# Patient Record
Sex: MALE | Race: BLACK OR AFRICAN AMERICAN | NOT HISPANIC OR LATINO | URBAN - METROPOLITAN AREA
[De-identification: names, ages, dates, MRNs, and addresses within clinical notes are randomized per-mention and may not be internally consistent; named-entity substitution may affect disease eponyms.]

---
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
## 2024-03-12 ENCOUNTER — ATHLETIC TRAINING SESSION (OUTPATIENT)
Dept: SPORTS MEDICINE | Facility: CLINIC | Age: 19
End: 2024-03-12
Payer: COMMERCIAL

## 2024-03-12 DIAGNOSIS — M25.551 PAIN OF RIGHT HIP: Primary | ICD-10-CM

## 2024-03-12 NOTE — PROGRESS NOTES
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 He states his hip pain began at the end of January and thought it was muscle soreness. The pain did not go away and began to slightly worsen. He has modified lifting on his own and has been completing less reps at practice. P! Is described as a deep ache and is rated 6/10 constantly and 8/10 at its worst. Pt admits the pain wakes him up out of his sleep sometimes and has a difficult time falling asleep due to p!. Pt experiences p! With walking and going up or down stairs. Pt experiences p! With lower body exercises and lower core. Pt throws hammer, shot put, and discus and experiences the most pain with hammer throw. Hx of surgery on MICHEL zamudio in 2015.     Handedness: right-handed  Sport played: track & field      Level: college            Pain  This is a new problem. The current episode started more than 1 month ago. The problem occurs daily. The problem has been unchanged.       ROS              Objective:       General: Americo is well-developed, well-nourished, appears stated age, in no acute distress, alert and oriented to time, place and person.     General    Constitutional: He appears well-developed and well-nourished.   Psychiatric: He has a normal mood and affect.     General Musculoskeletal Exam   Gait: normal     Right Ankle/Foot Exam     Tests   Heel Walk: able to perform  Tiptoe Walk: able to perform  Single Heel Rise: able to perform  Double Heel Rise: able to perform        Right Hip Exam     Inspection   Scars: absent  Swelling: absent  Bruising: absent  No deformity of hip.  Quadriceps Atrophy:  Negative    Range of Motion   Abduction:  normal   Adduction:  normal   Extension:  abnormal Right hip extension: with pain.  Flexion:  abnormal   External rotation:  abnormal Right hip external rotation: with pain.  Internal  rotation:  abnormal Right hip internal rotation: with pain.    Tests   Pain w/ forced internal rotation (YAIR): absent  Yvonne: negative  Trendelenburg Test: negative    Other   Sensation: normal  Left Hip Exam   Left hip exam is normal.      Back (L-Spine & T-Spine) / Neck (C-Spine) Exam     Back (L-Spine & T-Spine) Tests   Right Side Tests  Squat Test: unable to perform      Muscle Strength   Right Lower Extremity   Hip Adduction: 3/5     Pt is not TTP and states the pain is too deep to reach with palpation. Hip alignment: R side pelvis rotated posteriorly.         Assessment:     Status: L - Rehabilitation    Date Seen:  3/11/24    Date of Injury:  1/2024    Date Out:  3/11/24    Date Cleared:  n/a      Plan:       1. Pt has been scheduled with team physician on campus on 3/13/24. He has been instructed to not practice and lift only upper body and upper core. Pt has been given 220mg mediproxen. Pt hsa agreed to the plan and has been instructed to contact  if injury status changes.  2. Physician Referral: yes  3. ED Referral:no  4. Parent/Guardian Notified: No  5. All questions were answered, ath. will contact me for questions or concerns in  the interim.  6.         Eligible to use School Insurance: Yes

## 2024-03-13 ENCOUNTER — TELEPHONE (OUTPATIENT)
Dept: SPORTS MEDICINE | Facility: CLINIC | Age: 19
End: 2024-03-13
Payer: COMMERCIAL

## 2024-03-13 DIAGNOSIS — M25.551 RIGHT HIP PAIN: Primary | ICD-10-CM

## 2024-03-13 DIAGNOSIS — M99.03 SOMATIC DYSFUNCTION OF LUMBAR REGION: ICD-10-CM

## 2024-03-13 DIAGNOSIS — M99.05 SOMATIC DYSFUNCTION OF PELVIS REGION: ICD-10-CM

## 2024-03-13 DIAGNOSIS — M99.04 SOMATIC DYSFUNCTION OF SACRAL REGION: ICD-10-CM

## 2024-03-13 RX ORDER — MELOXICAM 15 MG/1
15 TABLET ORAL DAILY
Qty: 14 TABLET | Refills: 0 | Status: SHIPPED | OUTPATIENT
Start: 2024-03-13 | End: 2024-05-06

## 2024-03-13 NOTE — TELEPHONE ENCOUNTER
Athletic Training Room Note 03/13/2024  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 He gestures to the anterior aspect of the right hip when asked where he hurts and also endorses right low back pain. He denies missing time as a result of this injury.     Physical Exam:  HIP: right   The affected hip is compared to the contralateral hip.    Observation:    There is no edema, erythema, or ecchymosis in the lumbosacral region.   There is no Trendelenburg sign on either side  No obvious pelvic obliquity while standing.    No thoracolumbar scoliosis observed.    No midline skin abnormalities.  No atrophy noted in the lower limbs.    ROM:   Passive hip flexion to 120° bilaterally.    Passive hip internal rotation to 45° bilaterally.    Passive hip external rotation to 45° bilaterally.    Passive hip abduction to 45° bilaterally.    Pain present with internal and external rotation and flexion of the right hip    Tenderness To Palpation:  No tenderness at the ASIS, AIIS, PSIS, PIIS, iliac crest, pubic bones, ischial tuberosity.  No tenderness throughout the lumbar spine, iliolumbar region, or posterior pelvis.  No tenderness throughout the sacrum or piriformis  No tenderness over the greater trochanteric bursa or greater/lesser trochanters.  No tenderness at the glut attachments on the greater trochanter  No tenderness over proximal IT band or hip flexor musculature.    Strength Testing:  Hip flexion - 5/5  Hip extension - 5/5  Hip adduction - 5/5  Hip abduction - 5/5  Knee flexion - 5/5  Knee extension - 5/5    Special Tests:  Seated straight leg raise - negative  Supine straight leg raise - negative  Hamstring flexibility symmetric    Log roll - positive right  YAIR - negative  FADIR - positive right  Scour test - positive right  Positive pain  with posterior hip capsule compression on right    ASIS compression test - negative  SI drawer test - negative   Thigh thrust test - negative     Kyree test reveals tight hip flexor on right  Quadriceps flexibility tighter on right.  Yvonne test - negative  Jhonatan compression test - negative    Fulcrum test - negative      Neurovascular Exam:  Normal gait without Trendelenburg.  2+ femoral, DP, and PT pulses BL.  No skin changes, no abnormal hair distribution.  Sensation intact to light touch throughout the obturator and medial/lateral/posterior femoral cutaneous nerves.  Capillary refill intact to <2 seconds in all lower limb digits.       Cervical Spine  Thoracic Spine  Lumbar Spine   C1 Neutral T1 Neutral L1 Neutral   C2 Neutral T2 Neutral L2 Neutral   C3 Neutral T3 Neutral L3 Neutral   C4 Neutral T4 Neutral L4 ERSR   C5 Neutral T5 Neutral L5 ERSR   C6 Neutral T6 Neutral     C7 Neutral T7 Neutral       T8 Neutral       T9 Neutral       T10 Neutral       T11 Neutral       T12 Neutral       Pelvis:  Innominate:Right superior shear  Pubic bone:Right superior pubic shear    Sacrum:Left on Right sacral torsion    Lower Extremity:      Key   F= Flexed   E = Extended   R = Rotated   N = Neutral   S = Sidebent   TTA = tissue texture abnormality   L/R/B (last letter) = left/right/bilateral   HTP = fascial herniated trigger point   TB = fascial trigger band   CD = fascial continuum distortion       Assessment:  1. Right hip pain    2. Somatic dysfunction of lumbar region    3. Somatic dysfunction of sacral region    4. Somatic dysfunction of pelvis region          Plan:     Symptoms and exam findings are most consistent with myofascial restriction associated with poor compensation/neuromuscular firing.    Based on his description/body language of pain and somatic dysfunction identified on exam, I discussed osteopathic manipulation as a treatment option today.  He consents to evaluation and treatment.  See below.    OMT 3-4  regions. Oral consent obtained. Reviewed benefits and potential side effects. OMT indicated today due to signs and symptoms as well as local and remote somatic dysfunction findings and their related neurokinetic, lymphatic, fascial and/or arteriovenous body connections. OMT techniques used: Soft Tissue, Myofascial Release, Muscle Energy, and High Velocity Low Amplitude. Treatment was tolerated well. Improvement noted in segmental mobility post-treatment in dysfunctional regions. There were no adverse events and no complications immediately following treatment. Advised plenty of water to help alleviate soreness.    Medications - Mobic 15 mg to be taken daily for 2 weeks     Exercises - core, pelvis strengthening and stabilizing    Referrals - none at this time    Imaging - will obtain imaging if symptoms worsen or do not respond to OMT, hip x-ray, possibly MRI to assess for intra-articular pathology    ATR - Sport Participation: Full sport participation as tolerated    Follow up - as needed MIESHA WEBSTER            This note was completed in the presence of the physician noted above    This note is dictated using the M*Modal Fluency Direct word recognition program. There are word recognition mistakes that are occasionally missed on review.

## 2024-03-25 ENCOUNTER — HOSPITAL ENCOUNTER (OUTPATIENT)
Dept: RADIOLOGY | Facility: HOSPITAL | Age: 19
Discharge: HOME | End: 2024-03-25
Attending: ORTHOPAEDIC SURGERY
Payer: COMMERCIAL

## 2024-03-25 ENCOUNTER — OFFICE VISIT (OUTPATIENT)
Dept: SPORTS MEDICINE | Facility: CLINIC | Age: 19
End: 2024-03-25
Payer: COMMERCIAL

## 2024-03-25 VITALS — DIASTOLIC BLOOD PRESSURE: 64 MMHG | HEART RATE: 50 BPM | SYSTOLIC BLOOD PRESSURE: 131 MMHG | WEIGHT: 244.69 LBS

## 2024-03-25 DIAGNOSIS — M25.551 PAIN OF RIGHT HIP: ICD-10-CM

## 2024-03-25 DIAGNOSIS — M25.551 RIGHT HIP PAIN: ICD-10-CM

## 2024-03-25 DIAGNOSIS — M54.50 LUMBAR PAIN: Primary | ICD-10-CM

## 2024-03-25 DIAGNOSIS — M54.50 LUMBAR PAIN: ICD-10-CM

## 2024-03-25 PROCEDURE — 3078F DIAST BP <80 MM HG: CPT | Mod: CPTII,S$GLB,, | Performed by: ORTHOPAEDIC SURGERY

## 2024-03-25 PROCEDURE — 72114 X-RAY EXAM L-S SPINE BENDING: CPT | Mod: TC

## 2024-03-25 PROCEDURE — 73502 X-RAY EXAM HIP UNI 2-3 VIEWS: CPT | Mod: 26,RT,, | Performed by: INTERNAL MEDICINE

## 2024-03-25 PROCEDURE — 99204 OFFICE O/P NEW MOD 45 MIN: CPT | Mod: S$GLB,,, | Performed by: ORTHOPAEDIC SURGERY

## 2024-03-25 PROCEDURE — 3075F SYST BP GE 130 - 139MM HG: CPT | Mod: CPTII,S$GLB,, | Performed by: ORTHOPAEDIC SURGERY

## 2024-03-25 PROCEDURE — 73502 X-RAY EXAM HIP UNI 2-3 VIEWS: CPT | Mod: TC,RT

## 2024-03-25 PROCEDURE — 1160F RVW MEDS BY RX/DR IN RCRD: CPT | Mod: CPTII,S$GLB,, | Performed by: ORTHOPAEDIC SURGERY

## 2024-03-25 PROCEDURE — 72114 X-RAY EXAM L-S SPINE BENDING: CPT | Mod: 26,,, | Performed by: INTERNAL MEDICINE

## 2024-03-25 PROCEDURE — 99999 PR PBB SHADOW E&M-EST. PATIENT-LVL III: CPT | Mod: PBBFAC,,, | Performed by: ORTHOPAEDIC SURGERY

## 2024-03-25 PROCEDURE — 1159F MED LIST DOCD IN RCRD: CPT | Mod: CPTII,S$GLB,, | Performed by: ORTHOPAEDIC SURGERY

## 2024-03-25 NOTE — PROGRESS NOTES
CC: right hip pain    19 y.o. Male presents today for evaluation of his right hip pain. 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 He also endorses tightness at the right low back and glute musculature.   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 No pertinent past medical history.    PAST SURGICAL HISTORY:   History reviewed. No pertinent surgical history.    FAMILY HISTORY:   History reviewed. No pertinent family history.    SOCIAL HISTORY:   Social History     Socioeconomic History    Marital status: Single     Social Determinants of Health     Financial Resource Strain: Low Risk  (3/24/2024)    Overall Financial Resource Strain (CARDIA)     Difficulty of Paying Living Expenses: Not hard at all   Food Insecurity: No Food Insecurity (3/24/2024)    Hunger Vital Sign     Worried About Running Out of Food in the Last Year: Never true     Ran Out of Food in the Last Year: Never true   Transportation Needs: No Transportation Needs (3/24/2024)    PRAPARE - Transportation     Lack of Transportation (Medical): No     Lack of Transportation (Non-Medical): No   Physical Activity: Sufficiently Active (3/24/2024)    Exercise Vital Sign     Days of Exercise per Week: 6 days     Minutes of Exercise per Session: 60 min   Stress: No Stress Concern Present (3/24/2024)    [employer] [place] [employer] - Occupational Stress Questionnaire     Feeling of  Stress : Only a little   Social Connections: Unknown (3/24/2024)    Social Connection and Isolation Panel [NHANES]     Frequency of Communication with Friends and Family: More than three times a week     Frequency of Social Gatherings with Friends and Family: More than three times a week     Active Member of Clubs or Organizations: Yes     Attends Club or Organization Meetings: More than 4 times per year     Marital Status: Never    Housing Stability: Low Risk  (3/24/2024)    Housing Stability Vital Sign     Unable to Pay for Housing in the Last Year: No     Number of Places Lived in the Last Year: 2     Unstable Housing in the Last Year: No       MEDICATIONS:     Current Outpatient Medications:     meloxicam (MOBIC) 15 MG tablet, Take 1 tablet (15 mg total) by mouth once daily., Disp: 14 tablet, Rfl: 0    ALLERGIES:   Review of patient's allergies indicates:  No Known Allergies     PHYSICAL EXAMINATION:  /64   Pulse (!) 50   Wt 111 kg (244 lb 11.4 oz)   Vitals signs and nursing note have been reviewed.  General: In no acute distress, well developed, well nourished, no diaphoresis  Eyes: EOM full and smooth, no eye redness or discharge  HENT: normocephalic and atraumatic, neck supple, trachea midline, no nasal discharge, no external ear redness or discharge  Cardiovascular: no LE edema  Lungs: respirations non-labored, no conversational dyspnea   Abd: non-distended, no rigidity  MSK: no amputation or deformity, no swelling of extremities  Neuro: AAOx3, CN2-12 grossly intact  Skin: No rashes, warm and dry  Psychiatric: cooperative, pleasant, mood and affect appropriate for age    HIP: RIGHT  The affected hip is compared to the contralateral hip.    Observation:    There is no edema, erythema, or ecchymosis in the lumbosacral region.   No obvious pelvic obliquity while standing.    No thoracolumbar scoliosis observed.    No midline skin abnormalities.  No atrophy noted in the lower limbs.    ROM:    Passive hip flexion to 120° on left and 120° on right.    Passive hip internal rotation to 45° on left and 45° on right.    Passive hip external rotation to 45° on left and 45° on right.    Passive hip abduction to 45° on left and 45° on right.    All motions above are without pain.    Tenderness To Palpation:  + tenderness at the ASIS, AIIS, PSIS, PIIS  No tenderness over the iliac crest, pubic bones, ischial tuberosity.  No tenderness throughout the lumbar spine, iliolumbar region, or posterior pelvis.  No tenderness throughout the sacrum or piriformis  No tenderness over the greater trochanteric bursa or greater/lesser trochanters.  No tenderness at the glut attachments on the greater trochanter  + tenderness over proximal IT band and right hip flexor musculature    Strength Testing: (*pain)  Hip flexion - 5/5 on left and 5/5 on right  Hip extension - 5/5 on left and 5/5 on right  Hip adduction - 5/5 on left and 5/5 on right  Hip abduction - 5/5 on left and 5/5 on right  Knee flexion - 5/5 on left and 5/5 on right  Knee extension - 5/5 on left and 5/5 on right    Special Tests:  Seated straight leg raise - negative  Supine straight leg raise - negative  Hamstring flexibility symmetric  Quadriceps flexibility symmetric    Log roll - negative  YAIR - positive  FADIR - positive  Scour test - positive  + pain with posterior hip capsule compression    ASIS compression test - negative  SI drawer test - negative   Thigh thrust test - negative     Kyree test reveals tight iliopsoas, rectus femoris, and IT band.    Yvonne test - negative  Jhonatan compression test - negative    Fulcrum test - negative    Neurovascular Exam:  Normal gait without Trendelenburg.  2+ femoral, DP, and PT pulses BL.  No skin changes, no abnormal hair distribution.  Sensation intact to light touch throughout the obturator and medial/lateral/posterior femoral cutaneous nerves.  Capillary refill intact to <2 seconds in all lower limb  digits.      IMAGIN. X-ray ordered due to right hip pain   2. X-ray images were reviewed personally by me and then directly with patient.  3. FINDINGS: X-ray images obtained demonstrate no fracture or dislocation, hip joint spaces maintained  4. IMPRESSION: As above.     1. X-ray ordered due to right low back pain   2. X-ray images were reviewed personally by me and then directly with patient.  3. FINDINGS: X-ray images obtained demonstrate vertebral bodies are normally aligned and normal in height, disc spaces maintained, no significant osteophyte formation along vertebral endplates  4. IMPRESSION: As above.       ASSESSMENT:      ICD-10-CM ICD-9-CM   1. Lumbar pain  M54.50 724.2   2. Pain of right hip  M25.551 719.45         PLAN:  1-2. 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 He endorses mechanical symptoms and feelings of instability.    - XRs ordered in the office today and images were personally reviewed with the patient. See above for further detail.    - Symptoms, exam, and imaging are most consistent with hip joint pathology, most concerning for labral tear.  To better assess the structures of the hip joint, an MRI arthrogram has been ordered and scheduled.     - OK to continue with core strengthening/stabilization with his . Will hold from TF competition due to persistent pain that worsens with athletic activity.    - His  was present for the duration of the visit today and expressed understanding to the plan.       Future planning includes - next steps pending MRI results, possible IA CSI, PT    All questions were answered to the best of my ability and all concerns were addressed at this time.    Follow up after MRI for results and next steps.       This note is dictated using the M*Modal Fluency Direct word recognition  program. There are word recognition mistakes that are occasionally missed on review.

## 2024-03-26 ENCOUNTER — ATHLETIC TRAINING SESSION (OUTPATIENT)
Dept: SPORTS MEDICINE | Facility: CLINIC | Age: 19
End: 2024-03-26
Payer: COMMERCIAL

## 2024-03-26 DIAGNOSIS — M25.551 PAIN OF RIGHT HIP: Primary | ICD-10-CM

## 2024-03-26 NOTE — PROGRESS NOTES
3/18/24  Americo competed in his track meet on 3/16/24 despite experiencing p!. He has been instructed to stop practicing, complete his exercises daily, and collaborate with his throwing  for alternate training/lifting plan for the week. An appointment has been made with team physician for 3/26/24.     3/13/24  Americo was instructed to try and practice on Thursday if he feels comfortable and experiences no pain with throwing. If he experiences p! He was instructed to not compete.

## 2024-03-26 NOTE — PROGRESS NOTES
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    Handedness: right-handed  Sport played: track & field      Level: college            Pain  This is a new problem. The current episode started 1 to 4 weeks ago. The problem occurs constantly. The problem has been unchanged. The treatment provided no relief.       ROS              Objective:       General: Americo is well-developed, well-nourished, appears stated age, in no acute distress, alert and oriented to time, place and person.     AT Session  3/13/24  Americo has been given the following exercises to complete daily on his own.     Exercise Reps/Sets/Time   Pelvic clocks 2x10   Ant marches 2x10   Diagrammatic breaths x10   Kneeling hip flexor stretch  [id number]   Kneeling hip ABD stretch  [id number]            Assessment:     Status: L - Limited    Date Seen:  3/13/24    Date of Injury:  January 2024    Date Out:  3/18/24    Date Cleared:  n/a

## 2024-04-17 ENCOUNTER — HOSPITAL ENCOUNTER (OUTPATIENT)
Dept: RADIOLOGY | Facility: HOSPITAL | Age: 19
Discharge: HOME | End: 2024-04-17
Attending: ORTHOPAEDIC SURGERY
Payer: COMMERCIAL

## 2024-04-17 DIAGNOSIS — M25.551 PAIN OF RIGHT HIP: ICD-10-CM

## 2024-04-17 PROCEDURE — A9585 GADOBUTROL INJECTION: HCPCS | Performed by: ORTHOPAEDIC SURGERY

## 2024-04-17 PROCEDURE — 25000003 PHARM REV CODE 250: Performed by: ORTHOPAEDIC SURGERY

## 2024-04-17 PROCEDURE — 27093 INJECTION FOR HIP X-RAY: CPT | Mod: RT,,, | Performed by: RADIOLOGY

## 2024-04-17 PROCEDURE — 73722 MRI JOINT OF LWR EXTR W/DYE: CPT | Mod: 26,RT,, | Performed by: RADIOLOGY

## 2024-04-17 PROCEDURE — 63600175 PHARM REV CODE 636 W HCPCS: Mod: JZ,JG | Performed by: ORTHOPAEDIC SURGERY

## 2024-04-17 PROCEDURE — 73722 MRI JOINT OF LWR EXTR W/DYE: CPT | Mod: TC,RT

## 2024-04-17 PROCEDURE — 77002 NEEDLE LOCALIZATION BY XRAY: CPT | Mod: 26,RT,, | Performed by: RADIOLOGY

## 2024-04-17 PROCEDURE — 25500020 PHARM REV CODE 255: Performed by: ORTHOPAEDIC SURGERY

## 2024-04-17 RX ORDER — BUPIVACAINE HYDROCHLORIDE 2.5 MG/ML
5 INJECTION, SOLUTION EPIDURAL; INFILTRATION; INTRACAUDAL ONCE
Status: COMPLETED | OUTPATIENT
Start: 2024-04-17 | End: 2024-04-17

## 2024-04-17 RX ORDER — GADOBUTROL 604.72 MG/ML
5 INJECTION INTRAVENOUS
Status: COMPLETED | OUTPATIENT
Start: 2024-04-17 | End: 2024-04-17

## 2024-04-17 RX ORDER — LIDOCAINE HYDROCHLORIDE 10 MG/ML
1 INJECTION INFILTRATION; PERINEURAL ONCE
Status: COMPLETED | OUTPATIENT
Start: 2024-04-17 | End: 2024-04-17

## 2024-04-17 RX ADMIN — LIDOCAINE HYDROCHLORIDE 1 ML: 10 INJECTION, SOLUTION INFILTRATION; PERINEURAL at 01:04

## 2024-04-17 RX ADMIN — BUPIVACAINE HYDROCHLORIDE 12.5 MG: 2.5 INJECTION, SOLUTION EPIDURAL; INFILTRATION; INTRACAUDAL; PERINEURAL at 01:04

## 2024-04-17 RX ADMIN — GADOBUTROL 5 ML: 604.72 INJECTION INTRAVENOUS at 01:04

## 2024-04-17 RX ADMIN — IOHEXOL 5 ML: 300 INJECTION, SOLUTION INTRAVENOUS at 01:04

## 2024-04-19 ENCOUNTER — TELEPHONE (OUTPATIENT)
Dept: URGENT CARE | Facility: CLINIC | Age: 19
End: 2024-04-19
Payer: COMMERCIAL

## 2024-05-06 ENCOUNTER — OFFICE VISIT (OUTPATIENT)
Dept: SPORTS MEDICINE | Facility: CLINIC | Age: 19
End: 2024-05-06
Payer: COMMERCIAL

## 2024-05-06 VITALS
SYSTOLIC BLOOD PRESSURE: 123 MMHG | BODY MASS INDEX: 34.75 KG/M2 | HEART RATE: 69 BPM | HEIGHT: 70 IN | WEIGHT: 242.75 LBS | DIASTOLIC BLOOD PRESSURE: 76 MMHG

## 2024-05-06 DIAGNOSIS — M99.03 SOMATIC DYSFUNCTION OF LUMBAR REGION: ICD-10-CM

## 2024-05-06 DIAGNOSIS — M25.551 PAIN OF RIGHT HIP: Primary | ICD-10-CM

## 2024-05-06 DIAGNOSIS — M99.04 SOMATIC DYSFUNCTION OF SACRAL REGION: ICD-10-CM

## 2024-05-06 DIAGNOSIS — M99.05 SOMATIC DYSFUNCTION OF PELVIS REGION: ICD-10-CM

## 2024-05-06 PROCEDURE — 1160F RVW MEDS BY RX/DR IN RCRD: CPT | Mod: CPTII,S$GLB,, | Performed by: ORTHOPAEDIC SURGERY

## 2024-05-06 PROCEDURE — 3078F DIAST BP <80 MM HG: CPT | Mod: CPTII,S$GLB,, | Performed by: ORTHOPAEDIC SURGERY

## 2024-05-06 PROCEDURE — 98926 OSTEOPATH MANJ 3-4 REGIONS: CPT | Mod: S$GLB,,, | Performed by: ORTHOPAEDIC SURGERY

## 2024-05-06 PROCEDURE — 1159F MED LIST DOCD IN RCRD: CPT | Mod: CPTII,S$GLB,, | Performed by: ORTHOPAEDIC SURGERY

## 2024-05-06 PROCEDURE — 3074F SYST BP LT 130 MM HG: CPT | Mod: CPTII,S$GLB,, | Performed by: ORTHOPAEDIC SURGERY

## 2024-05-06 PROCEDURE — 3008F BODY MASS INDEX DOCD: CPT | Mod: CPTII,S$GLB,, | Performed by: ORTHOPAEDIC SURGERY

## 2024-05-06 PROCEDURE — 99999 PR PBB SHADOW E&M-EST. PATIENT-LVL III: CPT | Mod: PBBFAC,,, | Performed by: ORTHOPAEDIC SURGERY

## 2024-05-06 PROCEDURE — 99214 OFFICE O/P EST MOD 30 MIN: CPT | Mod: 25,S$GLB,, | Performed by: ORTHOPAEDIC SURGERY

## 2024-05-06 NOTE — PROGRESS NOTES
CC: right hip pain    19 y.o. Male presents today for evaluation of his right hip pain. 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  He has not found any significant improvement since he was initially held from track and field activity.  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    PAST SURGICAL HISTORY:   No past surgical history on file.    FAMILY HISTORY:   No family history on file.    SOCIAL HISTORY:   Social History     Socioeconomic History    Marital status: Single     Social Determinants of Health     Financial Resource Strain: Low Risk  (3/24/2024)    Overall Financial Resource Strain (CARDIA)     Difficulty of Paying Living Expenses: Not hard at all   Food Insecurity: No Food Insecurity (3/24/2024)    Hunger Vital Sign     Worried About Running Out of Food in the Last Year: Never true     Ran Out of Food in the Last Year: Never true   Transportation Needs: No Transportation Needs (3/24/2024)    PRAPARE - Transportation     Lack of Transportation (Medical): No     Lack of Transportation (Non-Medical): No   Physical Activity: Sufficiently Active (3/24/2024)    Exercise Vital Sign     Days of Exercise per Week: 6 days     Minutes of Exercise per Session: 60 min   Stress: No Stress Concern Present (3/24/2024)    [employer] [place] [employer] - Occupational Stress Questionnaire     Feeling of Stress : Only a little   Housing  "Stability: Low Risk  (3/24/2024)    Housing Stability Vital Sign     Unable to Pay for Housing in the Last Year: No     Number of Places Lived in the Last Year: 2     Unstable Housing in the Last Year: No       MEDICATIONS:   No current outpatient medications on file.    ALLERGIES:   Review of patient's allergies indicates:  No Known Allergies     PHYSICAL EXAMINATION:  /76   Pulse 69   Ht 5' 10" (1.778 m)   Wt 110.1 kg (242 lb 11.6 oz)   BMI 34.83 kg/m²   Vitals signs and nursing note have been reviewed.  General: In no acute distress, well developed, well nourished, no diaphoresis  Eyes: EOM full and smooth, no eye redness or discharge  HENT: normocephalic and atraumatic, neck supple, trachea midline, no nasal discharge, no external ear redness or discharge  Cardiovascular: no LE edema  Lungs: respirations non-labored, no conversational dyspnea   Abd: non-distended, no rigidity  MSK: no amputation or deformity, no swelling of extremities  Neuro: AAOx3, CN2-12 grossly intact  Skin: No rashes, warm and dry  Psychiatric: cooperative, pleasant, mood and affect appropriate for age    HIP: RIGHT  The affected hip is compared to the contralateral hip.    Observation:    There is no edema, erythema, or ecchymosis in the lumbosacral region.   No obvious pelvic obliquity while standing.    No thoracolumbar scoliosis observed.    No midline skin abnormalities.  No atrophy noted in the lower limbs.    ROM:   Passive hip flexion to 120° on left and 120° on right.    Passive hip internal rotation to 45° on left and 45° on right.    Passive hip external rotation to 45° on left and 45° on right.    Passive hip abduction to 45° on left and 45° on right.    All motions above are without pain.    Tenderness To Palpation:  + tenderness at the ASIS, AIIS  No tenderness at the PSIS, PIIS, iliac crest, pubic bones, ischial tuberosity.  No tenderness throughout the lumbar spine, iliolumbar region, or posterior pelvis.  No " tenderness throughout the sacrum or piriformis  No tenderness over the greater trochanteric bursa or greater/lesser trochanters.  + tenderness at the glut attachments on the greater trochanter  No tenderness over proximal IT band or hip flexor musculature.    Strength Testing: (*pain)  Hip flexion - 5/5 on left and 5/5 on right  Hip extension - 5/5 on left and 5/5 on right  Hip adduction - 5/5 on left and 5/5 on right  Hip abduction - 5/5 on left and 5/5 on right  Knee flexion - 5/5 on left and 5/5 on right  Knee extension - 5/5 on left and 5/5 on right    Special Tests:  Seated straight leg raise - negative  Supine straight leg raise - negative  Hamstring flexibility tighter on right  Quadriceps flexibility tighter on right    Log roll - positive  YAIR - positive  FADIR - positive  Scour test - positive  No pain with posterior hip capsule compression    ASIS compression test - negative  SI drawer test - negative   Thigh thrust test - negative     Kyree test reveals tight iliopsoas, rectus femoris, and IT band.    Yvonne test - negative  Jhonatan compression test - negative    Fulcrum test - negative    Posture:  Upright  Gait: Non-antalgic     TART (Tissue texture abnormality, Asymmetry,  Restriction of motion and/or Tenderness) changes:    Head:      Cervical Spine  Thoracic Spine  Lumbar Spine   C1 Neutral T1 Neutral L1 Neutral   C2 Neutral T2 Neutral L2 Neutral   C3 Neutral T3 Neutral L3 Neutral   C4 Neutral T4 Neutral L4 FRSL   C5 Neutral T5 Neutral L5 FRSL   C6 Neutral T6 Neutral     C7 Neutral T7 Neutral       T8 Neutral       T9 Neutral       T10 Neutral       T11 Neutral       T12 Neutral       Ribs:    Upper Extremity:    Abdomen:    Pelvis:  Innominate:Right anterior rotation  Pubic bone:Right inferior pubic shear    Sacrum:Left on Left sacral torsion    Lower Extremity:      Key   F= Flexed   E = Extended   R = Rotated   N = Neutral   S = Sidebent   TTA = tissue texture abnormality   L/R/B (last letter) =  left/right/bilateral   HTP = fascial herniated trigger point   TB = fascial trigger band   CD = fascial continuum distortion       Neurovascular Exam:  Normal gait without Trendelenburg.  2+ femoral, DP, and PT pulses BL.  No skin changes, no abnormal hair distribution.  Sensation intact to light touch throughout the obturator and medial/lateral/posterior femoral cutaneous nerves.  Capillary refill intact to <2 seconds in all lower limb digits.      IMAGIN. X-ray obtained 2024 due to right hip pain   2. X-ray images were reviewed personally by me and then directly with patient.  3. FINDINGS: X-ray images obtained demonstrate no fracture or dislocation, joint spaces maintained  4. IMPRESSION: As above.     1. MRI arthrogram obtained 2024 due to right hip pain   2. MRI images were reviewed personally by me and then directly with patient.  3. FINDINGS: MRI images obtained demonstrate slight signal heterogeneity of the anterior-superior acetabular labrum without signal intensity  4. IMPRESSION: As above.       ASSESSMENT:      ICD-10-CM ICD-9-CM   1. Pain of right hip  M25.551 719.45   2. Somatic dysfunction of lumbar region  M99.03 739.3   3. Somatic dysfunction of sacral region  M99.04 739.4   4. Somatic dysfunction of pelvis region  M99.05 739.5         PLAN:  Right hip pain -     - Americo is here today for right hip pain. 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     - XRs obtained 2025 and MRI arthrogram obtained 2024 and images were personally reviewed with the patient. See above for further detail.    - Symptoms, exam, and imaging are most consistent with hip impingement syndrome secondary to somatic dysfunction and poor neuromuscular firing patterns.  We discussed the importance of decreasing inflammation and strengthening and stabilizing to help promote and maintain symptom improvement/resolution.  This is  commonly accomplished with a short course of an anti-inflammatory and icing in addition to osteopathic manipulation, a home exercise program or physical therapy.    - Based on his description/body language of pain and somatic dysfunction identified on exam, I discussed osteopathic manipulation as a treatment option today.  He consents to evaluation and treatment.  See below.    - Physical therapy referral has been placed to start June when he returns back to [place].    - End of season paperwork has been filled out and e-mailed back to his .     - Contact has been made with their  who is on board with the plan.       2-4.  Somatic dysfunction of pelvis, sacral, lumbar regions -     - OMT 3-4 regions. Oral consent obtained. Reviewed benefits and potential side effects. OMT indicated today due to signs and symptoms as well as local and remote somatic dysfunction findings and their related neurokinetic, lymphatic, fascial and/or arteriovenous body connections. OMT techniques used: Soft Tissue, Myofascial Release, and Muscle Energy. Treatment was tolerated well. Improvement noted in segmental mobility post-treatment in dysfunctional regions. There were no adverse events and no complications immediately following treatment. Advised plenty of water to help alleviate soreness.      Future planning includes - physical therapy modifications, possibly more OMT if helpful and if indicated    All questions were answered to the best of my ability and all concerns were addressed at this time.    Follow up as needed.       This note is dictated using the M*Modal Fluency Direct word recognition program. There are word recognition mistakes that are occasionally missed on review.

## 2024-06-05 ENCOUNTER — CLINICAL SUPPORT (OUTPATIENT)
Dept: REHABILITATION | Facility: OTHER | Age: 19
End: 2024-06-05
Attending: ORTHOPAEDIC SURGERY
Payer: COMMERCIAL

## 2024-06-05 DIAGNOSIS — M25.60 DECREASED RANGE OF MOTION: ICD-10-CM

## 2024-06-05 DIAGNOSIS — M25.551 PAIN OF RIGHT HIP: ICD-10-CM

## 2024-06-05 DIAGNOSIS — R26.9 ABNORMAL GAIT: Primary | ICD-10-CM

## 2024-06-05 DIAGNOSIS — R53.1 DECREASED STRENGTH: ICD-10-CM

## 2024-06-05 PROCEDURE — 97161 PT EVAL LOW COMPLEX 20 MIN: CPT | Mod: PN

## 2024-06-05 PROCEDURE — 97140 MANUAL THERAPY 1/> REGIONS: CPT | Mod: PN

## 2024-06-05 PROCEDURE — 97112 NEUROMUSCULAR REEDUCATION: CPT | Mod: PN

## 2024-06-06 PROBLEM — R26.9 ABNORMAL GAIT: Status: ACTIVE | Noted: 2024-06-06

## 2024-06-06 PROBLEM — M25.60 DECREASED RANGE OF MOTION: Status: ACTIVE | Noted: 2024-06-06

## 2024-06-06 PROBLEM — R53.1 DECREASED STRENGTH: Status: ACTIVE | Noted: 2024-06-06

## 2024-06-06 NOTE — PLAN OF CARE
[facility]   Physical Therapy Initial Evaluation      Name: Americo Ulloa  [facility] Number: [id number]    Therapy Diagnosis:   Encounter Diagnoses   Name Primary?    Pain of right hip     Abnormal gait Yes    Decreased range of motion     Decreased strength         Physician: Wes Sullivan DO    Physician Orders: PT Eval and Treat   Medical Diagnosis from Referral: M25.551 (ICD-10-CM) - Pain of right hip   Evaluation Date: 6/5/2024  Authorization Period Expiration: 5/6/2024  Plan of Care Expiration: 8/30/2024  Progress Note Due: 7/5/2024  Visit # / Visits authorized: 1/ 1   FOTO: 6/5/2024    Time In: 4:46pm  Time Out: 5:45pm  Total Appointment Time (timed & untimed codes): 59 minutes    Precautions: Standard     Subjective     Date of onset: Jan 2024    History of current condition - AMERICO reports: Pt reports Right hip pain that started in Jan. He thought it was going to go away but it didn't. In march it was super painful. Now its just a nagging thing he deals with every day. He was told it was probably something with over use. He feels it the most when he tries to rotate the leg. But was told the MRI didn't really show that but it could be something with his tendon. 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 This was all started during indoor track. He competed 1x in outdoor season. No longer doing outdoor. His first meet will probably be in December. He does throw with his Right arm. Does wear a special shoe for the throwing but it is worn out. Needs new shoes. Pain is anterior and less deep. Describes it as an achy pain. Mild discomfort all the time. Painful during walking when his Right leg extends backwards too much. Takes a while to go to sleep because of the achiness in the leg. That never goes away and it takes him a while to find a position he can sleep. Of note he has low back pain and thinks that the he started to get after  the hip started hurting. The pain in the back is off to the Right. Describes it as the same mild and achy pain that is constant as the hip. Taya feels like it may be tightness.     He isn't training but he is working out, mostly doing upper body, RDLs, Leg extension, unable to do squats. He did see his ATC for a little bit and he was doing hip exercises.    Right knee surgery meniscal repair [employer] year of high school - did not have PT for this after surgery.     Medical History:   No past medical history on file.    Surgical History:   Americo Ulloa  has no past surgical history on file.    Medications:   Americo currently has no medications in their medication list.    Allergies:   Review of patient's allergies indicates:  No Known Allergies     Imaging, MRI studies: Impression:     1. Slight signal heterogeneity of the anterior-superior acetabular labrum without associated contrast imbibition to suggest a tear.     Electronically signed by resident: Autumn Reyes    Prior Therapy: No previous PT. Saw ATC   Social History:  lives with a friend  Occupation: [employer]   Prior Level of Function: Independent  Current Level of Function: Difficulty with recreational activities.     Pain:  Hip Current 1/10, worst 8/10, best 1/10  Low back Current 0/10, Worst 2/10, best 0/10  Location: right ant hip  Description: Aching and Tight  Aggravating Factors: Sitting, Standing, Bending, and Walking  Easing Factors: pain medication and rest    Pts goals: Would like the pain to go away so he can return to his normal stuff.     Objective     Observation: Pt appears well nourished and in no signs of distress.   Posture: B pes planus, internally rotation shoulder, scapular abduction,, thoracic kyphosis    Gait: lack of hip extension B. Left rotation extension   Sensation: intact    Functional Tests:  Squat: Increased hip flexion pain 7/10  Single leg balance: left hip drop during Right balance,  no pain  Single leg squat: Right femoral IR/Add hip drop on Left - no pain    Range of Motion/Strength:       Hip Right Left Pain/Dysfunction with Movement   AROM      flexion  105  115 Pain on R   extension  5  10 Pain on R   Internal rotation  20  25 Pain on R   External rotation  25  35        L/E MMT Right Left Pain/Dysfunction with Movement   Hip Flexion/Hooklying 3/5 4+/5 AGMR   Hip Extension 4-/5 5/5    Hip Abduction/PGM 3/5 4/5    Supine Hip IR 3+/5 5/5 Pain on R   Supine Hip ER 3+/5 5/5 Pain on R     L/E Strength w/ MicroFET Muscle Antoine Dynamometer Right Left LSI%   Hip Flexion 26.6 kg  44 kg  60% pain   Hip Ext 31.8 kg  43.1 kg  73%   Hip IR 34.5 kg  31.1 kg  110%   Hip ER 16.5 kg  40.6 kg  41% pain       Joint Mobility:   - Hip: IR hypomobility on the Right with pain, painful endfeel into hip flexion     Special Tests:   Step down test - increase trunk lean, Right hip elevated, femoral add/IR   + Scour - pain with flexion/adduction  + YAIR  + FADDIR   - LLD - no rotation seen in pelvis and equal malleoli       CMS Impairment/Limitation/Restriction for FOTO Survey    Therapist reviewed FOTO scores for Americo Ulloa on 6/5/2024.   FOTO documents entered into [employer] - see Media section.           TREATMENT     Treatment Time In: 5:22  Treatment Time Out: 5:45  Total Treatment time separate from Evaluation: 23 minutes    AMERICO received the following manual therapy techniques: Joint mobilizations were applied to the: Right hip for 8 minutes, including:  LAD grade 4-5 mobilization  True Hip distraction grade 4-5 mobilization     AMERICO participated in neuromuscular re-education activities to improve: Coordination, Kinesthetic, Sense, Proprioception, and Posture for 15 minutes. The following activities were included:  Pretzel hip extended 2x10   Modified side plank clamshell 2x10   Seated hip IR/ER hip mobilization x10  Pt education    Home Exercises and Patient Education Provided    Education provided:    - Pt educated on POC  - Pt educated on HEP  - Pt educated on anatomy and physiology of current condition as it relates to signs and symptoms    Written Home Exercises Provided: yes.  Exercises were reviewed and AMERICO was able to demonstrate them prior to the end of the session.  AMERICO demonstrated good  understanding of the education provided.     See EMR under Patient Instructions for exercises provided 6/5/2024.    Assessment     Americo is a 19 y.o. male referred to outpatient Physical Therapy with a medical diagnosis of pain of right hip. Patient presents with signs and symptoms of hip impingement due to poor hip control and pain. He demonstrates anterior glide medial rotation during hip flexion isolation. He further demonstrates hip internal rotation joint mobility deficits which may further be contributing to dysfunction. He would benefit from continued skilled PT to address dysfunction listed above in order to return pt to previous level of function.    Patient prognosis is Good.   Patient will benefit from skilled outpatient Physical Therapy to address the deficits stated above and in the chart below, provide patient /family education, and to maximize patientt's level of independence.     Plan of care discussed with patient: Yes  Patient's spiritual, cultural and educational needs considered and patient is agreeable to the plan of care and goals as stated below:     Anticipated Barriers for therapy: None    Medical Necessity is demonstrated by the following  History  Co-morbidities and personal factors that may impact the plan of care [x] LOW: no personal factors / co-morbidities  [] MODERATE: 1-2 personal factors / co-morbidities  [] HIGH: 3+ personal factors / co-morbidities    Moderate / High Support Documentation:   Co-morbidities affecting plan of care: none    Personal Factors:   no deficits     Examination  Body Structures and Functions, activity limitations and participation restrictions that  may impact the plan of care [] LOW: addressing 1-2 elements  [x] MODERATE: 3+ elements  [] HIGH: 4+ elements (please support below)    Moderate / High Support Documentation: Difficulty walking, standing, squatting      Clinical Presentation [x] LOW: stable  [] MODERATE: Evolving  [] HIGH: Unstable     Decision Making/ Complexity Score: low           Goals:  STG (6 Weeks)  Pt will be independent with Initial home exercise program in order to facilitate Physical Therapy treatment  Pt will reduce worst pain to 4/10 in order to perform ADLs  Pt will improve hip flexion and IR by 5' in order to perform recreational tasks  Pt will improve hip External rotation and flexion strength to >80% contralateral LE in order to perform recreational tasks     LTG(12weeks)  Pt will be independent c final home exercise program in order to DC to home program  Pt will improve FOTO limitation to 76% indicative of overall improvement in function   Pt will improve worst pain to 1/10 in order to return to previous level of function   Pt will improve hip External rotation and flexion strength to >90% contralateral LE in order to perform recreational tasks     Plan     Plan of care Certification: 6/5/2024 to 8/30/2024.    Outpatient Physical Therapy 2 times weekly for 12 weeks to include the following interventions: Electrical Stimulation PRN, Gait Training, Manual Therapy, Moist Heat/ Ice, Neuromuscular Re-ed, Patient Education, Therapeutic Activities, and Therapeutic Exercise.     Brielle Story, PT, DPT, OCS

## 2024-06-14 ENCOUNTER — CLINICAL SUPPORT (OUTPATIENT)
Dept: REHABILITATION | Facility: OTHER | Age: 19
End: 2024-06-14
Payer: COMMERCIAL

## 2024-06-14 DIAGNOSIS — R26.9 ABNORMAL GAIT: Primary | ICD-10-CM

## 2024-06-14 DIAGNOSIS — R53.1 DECREASED STRENGTH: ICD-10-CM

## 2024-06-14 DIAGNOSIS — M25.60 DECREASED RANGE OF MOTION: ICD-10-CM

## 2024-06-14 PROCEDURE — 97140 MANUAL THERAPY 1/> REGIONS: CPT | Mod: PN

## 2024-06-14 PROCEDURE — 97112 NEUROMUSCULAR REEDUCATION: CPT | Mod: PN

## 2024-06-14 PROCEDURE — 97530 THERAPEUTIC ACTIVITIES: CPT | Mod: PN

## 2024-06-14 NOTE — PROGRESS NOTES
[facility]   Physical Therapy Treatment Note      Name: Americo Ulloa  Clinic Number: [id number]    Therapy Diagnosis:   Encounter Diagnoses   Name Primary?    Abnormal gait Yes    Decreased range of motion     Decreased strength      Physician: Wes Sullivan DO    Visit Date: 6/14/2024    Physician Orders: PT Eval and Treat   Medical Diagnosis from Referral: M25.551 (ICD-10-CM) - Pain of right hip   Evaluation Date: 6/5/2024  Authorization Period Expiration: 5/6/2024  Plan of Care Expiration: 8/30/2024  Progress Note Due: 7/5/2024  Visit # / Visits authorized: 1/ 10 + eval   FOTO: 6/5/2024     Time In: 9:04am  Time Out: 10:03am  Total Appointment Time (timed & untimed codes): 59 minutes     Precautions: Standard     Subjective     Pt reports: During workout its feels a lot better. Doing the exercises before helps. When he is not working out, if he goes to twist his leg, then he will feel it.   He was compliant with home exercise program.  Response to previous treatment: last was evaluation  Functional change: less pain during workouts    Pain: 2/10  Location: right hip      Objective      Objective Measures updated at progress report unless specified.     Abd HHD:  Left 74.3 lb  Right 64.8lb     - no shoes     Ratio:   Left - 30%  Right - 26%    Torso Endurance Testing   Time   Flexion Hold 47sec   R side Plank Hold 67sec   L side Plank Hold 48sec   Extension Hold 92sec      Ratio   Flexion:Extension 45%   R side: L side 139%   L side:Extension  52%   R side: Extension 72%       Flexion:Extension - Ratio < 1.0 (Less Than)  Right/Left Side Plank - < 0.05 Right to Left   Right/Left Side Plank:Extension - Ratio < .75           Treatment     AMERICO received the treatments listed below:      Bold = Performed    AMERICO received the following manual therapy techniques: Joint mobilizations were applied to the: Right hip for 10 minutes, including:  LAD grade 4-5 mobilization  True  Hip distraction grade 4-5 mobilization   Hip distraction with IR mobilization      AMERICO participated in neuromuscular re-education activities to improve: Coordination, Kinesthetic, Sense, Proprioception, and Posture for 35 minutes. The following activities were included:  Hernandez pose hips ER x20  Pretzel hip extended 2x10 2#  Modified side plank clamshell 2x10   Kneel on stool ER RTB 2x10  Pt education    Not today:  Seated hip IR/ER hip mobilization x10    AMERICO participated in dynamic functional therapeutic activities to improve functional performance for 10  minutes, including:  Step up resisted Abd with sports cord 3x10      Patient Education and Home Exercises       Education provided:   - Pt educated on performing Ant Pelvic tilt if attempting squatting.     Written Home Exercises Provided: Patient instructed to cont prior HEP. Exercises were reviewed and AMERICO was able to demonstrate them prior to the end of the session.  AMEIRCO demonstrated good  understanding of the education provided. See EMR under Patient Instructions for exercises provided during therapy sessions    Assessment     Americo presents to PT with improvement in hip pain. He continues to demonstrate hip mobility and strength deficits. Pain with hip flexion and rotation activities. Based on assessment above, hip abduction strength to body weight ratio is indicative of higher risk of future non-contact injury. PT to address mobility, strength, and motor control deficits in order to return pt to previous level of function.     AMERICO Is progressing well towards his goals.   Pt prognosis is Good.     Pt will continue to benefit from skilled outpatient physical therapy to address the deficits listed in the problem list box on initial evaluation, provide pt/family education and to maximize pt's level of independence in the home and community environment.     Pt's spiritual, cultural and educational needs considered and pt agreeable to plan  of care and goals.     Anticipated barriers to physical therapy: none    Goals:   STG (6 Weeks)  Pt will be independent with Initial home exercise program in order to facilitate Physical Therapy treatment  Pt will reduce worst pain to 4/10 in order to perform ADLs  Pt will improve hip flexion and IR by 5' in order to perform recreational tasks  Pt will improve hip External rotation and flexion strength to >80% contralateral LE in order to perform recreational tasks      LTG(12weeks)  Pt will be independent c final home exercise program in order to DC to home program  Pt will improve FOTO limitation to 76% indicative of overall improvement in function   Pt will improve worst pain to 1/10 in order to return to previous level of function   Pt will improve hip External rotation and flexion strength to >90% contralateral LE in order to perform recreational tasks     Plan     Continue with above.     Brielle Story, PT, DPT, OCS

## 2024-06-21 ENCOUNTER — CLINICAL SUPPORT (OUTPATIENT)
Dept: REHABILITATION | Facility: OTHER | Age: 19
End: 2024-06-21
Payer: COMMERCIAL

## 2024-06-21 DIAGNOSIS — M25.60 DECREASED RANGE OF MOTION: ICD-10-CM

## 2024-06-21 DIAGNOSIS — R53.1 DECREASED STRENGTH: ICD-10-CM

## 2024-06-21 DIAGNOSIS — R26.9 ABNORMAL GAIT: Primary | ICD-10-CM

## 2024-06-21 PROCEDURE — 97530 THERAPEUTIC ACTIVITIES: CPT | Mod: PN

## 2024-06-21 PROCEDURE — 97140 MANUAL THERAPY 1/> REGIONS: CPT | Mod: PN

## 2024-06-21 PROCEDURE — 97112 NEUROMUSCULAR REEDUCATION: CPT | Mod: PN

## 2024-06-21 NOTE — PROGRESS NOTES
[facility]   Physical Therapy Treatment Note      Name: Americo Ulloa  Clinic Number: [id number]    Therapy Diagnosis:   Encounter Diagnoses   Name Primary?    Abnormal gait Yes    Decreased range of motion     Decreased strength        Physician: Wes Sullivan DO    Visit Date: 6/21/2024    Physician Orders: PT Eval and Treat   Medical Diagnosis from Referral: M25.551 (ICD-10-CM) - Pain of right hip   Evaluation Date: 6/5/2024  Authorization Period Expiration: 5/6/2024  Plan of Care Expiration: 8/30/2024  Progress Note Due: 7/5/2024  Visit # / Visits authorized: 2/ 10 + eval   FOTO: 6/5/2024     Time In: 10:02am  Time Out: 10:59am  Total Appointment Time (timed & untimed codes): 57 minutes     Precautions: Standard     Subjective     Pt reports: Feeling better, primary throws weight for indoor. Has not tried squatting yet but was planning on it today. Has most of his pain when he tries to rotate his foot inwards.   He was compliant with home exercise program.  Response to previous treatment: less pain   Functional change: less pain during workouts    Pain: 1/10  Location: right hip      Objective      Objective Measures updated at progress report unless specified.     Hip range of motion:  Left  IR 35  ER 40    Right   IR 20(initiation of tightness) 25 pain  ER 45  Treatment     AMERICO received the treatments listed below:      Bold = Performed    AMERICO received the following manual therapy techniques: Joint mobilizations were applied to the: Right hip for 19 minutes, including:  LAD grade 4-5 mobilization  True Hip distraction grade 4-5 mobilization   Mobilization with movement into hip flexion   Hip distraction with IR mobilization   Prone 90' IR/ER perturbations      AMERICO participated in neuromuscular re-education activities to improve: Coordination, Kinesthetic, Sense, Proprioception, and Posture for 25 minutes. The following activities were included:  Hernandez pose hips  ER/IR x20  Pretzel hip flexed 3x10  Kneel on stool ER RTB 3x10  Pt education    Not today:  Modified side plank clamshell 2x10   Seated hip IR/ER hip mobilization x10    AMERICO participated in dynamic functional therapeutic activities to improve functional performance for 10  minutes, including:  Step up resisted Abd with sports cord 3x10      Patient Education and Home Exercises       Education provided:   - Pt educated on performing Ant Pelvic tilt if attempting squatting.     Written Home Exercises Provided: Patient instructed to cont prior HEP. Exercises were reviewed and AMERICO was able to demonstrate them prior to the end of the session.  AMERICO demonstrated good  understanding of the education provided. See EMR under Patient Instructions for exercises provided during therapy sessions    Assessment   Americo presents to PT today with reports of improvement in hip pain. He continues to show IR range of motion deficits with pain and pain with resisted ER. Hip flexion mobility and pain improved with manual performed. Continued hip rotation strength in both open and closed chain position.       AMERICO Is progressing well towards his goals.   Pt prognosis is Good.     Pt will continue to benefit from skilled outpatient physical therapy to address the deficits listed in the problem list box on initial evaluation, provide pt/family education and to maximize pt's level of independence in the home and community environment.     Pt's spiritual, cultural and educational needs considered and pt agreeable to plan of care and goals.     Anticipated barriers to physical therapy: none    Goals:   STG (6 Weeks)  Pt will be independent with Initial home exercise program in order to facilitate Physical Therapy treatment  Pt will reduce worst pain to 4/10 in order to perform ADLs  Pt will improve hip flexion and IR by 5' in order to perform recreational tasks  Pt will improve hip External rotation and flexion strength to >80%  contralateral LE in order to perform recreational tasks      LTG(12weeks)  Pt will be independent c final home exercise program in order to DC to home program  Pt will improve FOTO limitation to 76% indicative of overall improvement in function   Pt will improve worst pain to 1/10 in order to return to previous level of function   Pt will improve hip External rotation and flexion strength to >90% contralateral LE in order to perform recreational tasks     Plan     Continue with above.     Brielle Story, PT, DPT, OCS

## 2024-06-28 ENCOUNTER — CLINICAL SUPPORT (OUTPATIENT)
Dept: REHABILITATION | Facility: OTHER | Age: 19
End: 2024-06-28
Payer: COMMERCIAL

## 2024-06-28 DIAGNOSIS — R53.1 DECREASED STRENGTH: ICD-10-CM

## 2024-06-28 DIAGNOSIS — M25.60 DECREASED RANGE OF MOTION: ICD-10-CM

## 2024-06-28 DIAGNOSIS — R26.9 ABNORMAL GAIT: Primary | ICD-10-CM

## 2024-06-28 PROCEDURE — 97140 MANUAL THERAPY 1/> REGIONS: CPT | Mod: PN

## 2024-06-28 PROCEDURE — 97112 NEUROMUSCULAR REEDUCATION: CPT | Mod: PN

## 2024-06-28 PROCEDURE — 97530 THERAPEUTIC ACTIVITIES: CPT | Mod: PN

## 2024-06-28 NOTE — PROGRESS NOTES
[facility]   Physical Therapy Treatment Note      Name: Americo Ulloa  Clinic Number: [id number]    Therapy Diagnosis:   Encounter Diagnoses   Name Primary?    Abnormal gait Yes    Decreased range of motion     Decreased strength          Physician: Wes Sullivan DO    Visit Date: 6/28/2024    Physician Orders: PT Eval and Treat   Medical Diagnosis from Referral: M25.551 (ICD-10-CM) - Pain of right hip   Evaluation Date: 6/5/2024  Authorization Period Expiration: 5/6/2024  Plan of Care Expiration: 8/30/2024  Progress Note Due: 7/5/2024  Visit # / Visits authorized: 2/ 10 + eval   FOTO: 6/5/2024     Time In: 8:55 am  Time Out: 9:55 am  Total Appointment Time (timed & untimed codes): 60 minutes     Precautions: Standard     Subjective     Pt reports: He is feeling better overall with less pain at night. Has not yet returned to squatting  He was compliant with home exercise program.  Response to previous treatment: less pain   Functional change: less pain during workouts    Pain: 1/10  Location: right hip      Objective      Objective Measures updated at progress report unless specified.     Hip range of motion:  Left  IR 35  ER 40    Right   IR 20(initiation of tightness) 25 pain  ER 45      Treatment     AMERICO received the treatments listed below:      Bold = Performed    AMERICO received the following manual therapy techniques: Joint mobilizations were applied to the: Right hip for 10 minutes, including:  LAD grade 4-5 mobilization  True Hip distraction grade 4-5 mobilization   Mobilization with movement into hip flexion   Hip distraction with IR mobilization        AMERICO participated in neuromuscular re-education activities to improve: Coordination, Kinesthetic, Sense, Proprioception, and Posture for 30 minutes. The following activities were included:    Prone 90' IR/ER perturbations   Pretzel hip flexed 3x10  Y strap hip ER 3x10 10#  Side plank with Hip abduction 3x10  Hip  thrust SL 3x8    Pt education    Not today:  Kneel on stool ER RTB 3x10  Hernandez pose hips ER/IR x20  Modified side plank clamshell 2x10   Seated hip IR/ER hip mobilization x10    AMERICO participated in dynamic functional therapeutic activities to improve functional performance for 20  minutes, including:  Lateral step down 3x6 - 8in to 6in  Squat 5x10 (last 2 sets at 95#)        Patient Education and Home Exercises       Education provided:   - Pt educated on performing Ant Pelvic tilt if attempting squatting.     Written Home Exercises Provided: Patient instructed to cont prior HEP. Exercises were reviewed and AMERICO was able to demonstrate them prior to the end of the session.  AMERICO demonstrated good  understanding of the education provided. See EMR under Patient Instructions for exercises provided during therapy sessions    Assessment   Americo continues to have good carryover of hip range of motion from session to session. Still has pain with hip external rotation and internal rotation. He was able to progress to squatting with only slight pain at end range of motion. He squat form was poor and did no allow hip flexion. He has a quad dominate lateral step down. Continued hip rotation strength in both open and closed chain position.       AMERICO Is progressing well towards his goals.   Pt prognosis is Good.     Pt will continue to benefit from skilled outpatient physical therapy to address the deficits listed in the problem list box on initial evaluation, provide pt/family education and to maximize pt's level of independence in the home and community environment.     Pt's spiritual, cultural and educational needs considered and pt agreeable to plan of care and goals.     Anticipated barriers to physical therapy: none    Goals:   STG (6 Weeks)  Pt will be independent with Initial home exercise program in order to facilitate Physical Therapy treatment  Pt will reduce worst pain to 4/10 in order to perform  ADLs  Pt will improve hip flexion and IR by 5' in order to perform recreational tasks  Pt will improve hip External rotation and flexion strength to >80% contralateral LE in order to perform recreational tasks      LTG(12weeks)  Pt will be independent c final home exercise program in order to DC to home program  Pt will improve FOTO limitation to 76% indicative of overall improvement in function   Pt will improve worst pain to 1/10 in order to return to previous level of function   Pt will improve hip External rotation and flexion strength to >90% contralateral LE in order to perform recreational tasks     Plan     Continue with above.     Indio Quarles, PT, DPT

## 2024-07-05 ENCOUNTER — CLINICAL SUPPORT (OUTPATIENT)
Dept: REHABILITATION | Facility: OTHER | Age: 19
End: 2024-07-05
Payer: COMMERCIAL

## 2024-07-05 DIAGNOSIS — R26.9 ABNORMAL GAIT: Primary | ICD-10-CM

## 2024-07-05 DIAGNOSIS — R53.1 DECREASED STRENGTH: ICD-10-CM

## 2024-07-05 DIAGNOSIS — M25.60 DECREASED RANGE OF MOTION: ICD-10-CM

## 2024-07-05 PROCEDURE — 97140 MANUAL THERAPY 1/> REGIONS: CPT | Mod: PN

## 2024-07-05 PROCEDURE — 97112 NEUROMUSCULAR REEDUCATION: CPT | Mod: PN

## 2024-07-05 PROCEDURE — 97530 THERAPEUTIC ACTIVITIES: CPT | Mod: PN

## 2024-07-05 NOTE — PROGRESS NOTES
[facility]   Physical Therapy Treatment Note      Name: Americo Ulloa  Clinic Number: [id number]    Therapy Diagnosis:   Encounter Diagnoses   Name Primary?    Abnormal gait Yes    Decreased range of motion     Decreased strength        Physician: Wes Sullivan DO    Visit Date: 7/5/2024    Physician Orders: PT Eval and Treat   Medical Diagnosis from Referral: M25.551 (ICD-10-CM) - Pain of right hip   Evaluation Date: 6/5/2024  Authorization Period Expiration: 5/6/2024  Plan of Care Expiration: 8/30/2024  Progress Note Due: 7/5/2024  Visit # / Visits authorized: 4/ 10 + eval   FOTO: 6/5/2024     Time In: 9:00 am  Time Out: 10:00 am  Total Appointment Time (timed & untimed codes): 60 minutes     Precautions: Standard     Subjective     Pt reports: He was able to return to squatting with no pain  He was compliant with home exercise program.  Response to previous treatment: less pain   Functional change: less pain during workouts    Pain: 1/10  Location: right hip      Objective      Objective Measures updated at progress report unless specified.     Hip range of motion:  Left  IR 35  ER 40    Right   IR 25(initiation of tightness) 30 pain  ER 45    Y Balance:   R = 44cm average  L = 54    Treatment     AMERICO received the treatments listed below:      Bold = Performed    AMERICO received the following manual therapy techniques: Joint mobilizations were applied to the: Right hip for 8 minutes, including:  LAD grade 4-5 mobilization  True Hip distraction grade 4-5 mobilization   Mobilization with movement into hip flexion   Hip distraction with IR mobilization        AMERICO participated in neuromuscular re-education activities to improve: Coordination, Kinesthetic, Sense, Proprioception, and Posture for 30 minutes. The following activities were included:    Prone ER IR 2x10  90/90 hip stretch 10x each  Prone 90' IR/ER perturbations   Pretzel hip flexed 3x10  Pretzel in side lying with  range into internal rotation 2x12  Y strap hip ER 3x10 10#  Hip thrust SL 3x10  Pt education    Not today:  Side plank with Hip abduction 3x10  Kneel on stool ER RTB 3x10  Hernandez pose hips ER/IR x20  Modified side plank clamshell 2x10   Seated hip IR/ER hip mobilization x10    AMERICO participated in dynamic functional therapeutic activities to improve functional performance for 22 minutes, including:    Dynamic warm up  bike 5 min  10 yards x 2  - knee to chest 2x10  - heel to butt  - sweeps  - lateral lunge  - Lateral walks  - monster walks  RFESS glute dominate 3x10  Y Balance assessment         Patient Education and Home Exercises       Education provided:   - Pt educated on performing Ant Pelvic tilt if attempting squatting.     Written Home Exercises Provided: Patient instructed to cont prior HEP. Exercises were reviewed and AMERICO was able to demonstrate them prior to the end of the session.  AMERICO demonstrated good  understanding of the education provided. See EMR under Patient Instructions for exercises provided during therapy sessions    Assessment   Americo was progressed with glute strengthening exercises. He still has some pain with end range hip ER/IR but he was made better after hip external range of motion exercises. He has been able to return to squatting and exercising with minimal pain. He needs to further increase his hip strength as his Y balance performance as poor. Plan to progress as tolerated.       AMERICO Is progressing well towards his goals.   Pt prognosis is Good.     Pt will continue to benefit from skilled outpatient physical therapy to address the deficits listed in the problem list box on initial evaluation, provide pt/family education and to maximize pt's level of independence in the home and community environment.     Pt's spiritual, cultural and educational needs considered and pt agreeable to plan of care and goals.     Anticipated barriers to physical therapy: none    Goals:    STG (6 Weeks)  Pt will be independent with Initial home exercise program in order to facilitate Physical Therapy treatment  Pt will reduce worst pain to 4/10 in order to perform ADLs  Pt will improve hip flexion and IR by 5' in order to perform recreational tasks  Pt will improve hip External rotation and flexion strength to >80% contralateral LE in order to perform recreational tasks      LTG(12weeks)  Pt will be independent c final home exercise program in order to DC to home program  Pt will improve FOTO limitation to 76% indicative of overall improvement in function   Pt will improve worst pain to 1/10 in order to return to previous level of function   Pt will improve hip External rotation and flexion strength to >90% contralateral LE in order to perform recreational tasks     Plan     Continue with above.     Indio Quarles, PT, DPT

## 2024-07-12 ENCOUNTER — CLINICAL SUPPORT (OUTPATIENT)
Dept: REHABILITATION | Facility: OTHER | Age: 19
End: 2024-07-12
Payer: COMMERCIAL

## 2024-07-12 DIAGNOSIS — M25.60 DECREASED RANGE OF MOTION: ICD-10-CM

## 2024-07-12 DIAGNOSIS — R26.9 ABNORMAL GAIT: Primary | ICD-10-CM

## 2024-07-12 DIAGNOSIS — R53.1 DECREASED STRENGTH: ICD-10-CM

## 2024-07-12 PROCEDURE — 97112 NEUROMUSCULAR REEDUCATION: CPT | Mod: PN

## 2024-07-12 PROCEDURE — 97140 MANUAL THERAPY 1/> REGIONS: CPT | Mod: PN

## 2024-07-12 PROCEDURE — 97530 THERAPEUTIC ACTIVITIES: CPT | Mod: PN

## 2024-07-12 NOTE — PROGRESS NOTES
[facility]   Physical Therapy Treatment Note      Name: Americo Ulloa  Clinic Number: [id number]    Therapy Diagnosis:   No diagnosis found.      Physician: Wes Sullivan DO    Visit Date: 7/12/2024    Physician Orders: PT Eval and Treat   Medical Diagnosis from Referral: M25.551 (ICD-10-CM) - Pain of right hip   Evaluation Date: 6/5/2024  Authorization Period Expiration: 5/6/2024  Plan of Care Expiration: 8/30/2024  Progress Note Due: 7/5/2024  Visit # / Visits authorized: 4/ 10 + eval   FOTO: 6/5/2024     Time In: 9:00 am  Time Out: 10:00 am  Total Appointment Time (timed & untimed codes): 60 minutes     Precautions: Standard     Subjective     Pt reports: He was able to return to squatting with no pain  He was compliant with home exercise program.  Response to previous treatment: less pain   Functional change: less pain during workouts    Pain: 1/10  Location: right hip      Objective      Objective Measures updated at progress report unless specified.     Hip range of motion:  Left  IR 35  ER 40    Right   IR 25(initiation of tightness) 30 pain  ER 45    Y Balance:   R = 44cm average  L = 54    Treatment     AMERICO received the treatments listed below:      Bold = Performed    AMERICO received the following manual therapy techniques: Joint mobilizations were applied to the: Right hip for 8 minutes, including:  LAD grade 4-5 mobilization  True Hip distraction grade 4-5 mobilization   Mobilization with movement into hip flexion   Hip distraction with IR mobilization        AMERICO participated in neuromuscular re-education activities to improve: Coordination, Kinesthetic, Sense, Proprioception, and Posture for 30 minutes. The following activities were included:    Prone ER IR 2x10  90/90 hip stretch 10x each  Prone 90' IR/ER perturbations   Pretzel hip flexed 3x10  Pretzel in side lying with range into internal rotation 2x12  Y strap hip ER 3x10 10#  Hip thrust SL 3x10  Pt  education    Not today:  Side plank with Hip abduction 3x10  Kneel on stool ER RTB 3x10  Hernandez pose hips ER/IR x20  Modified side plank clamshell 2x10   Seated hip IR/ER hip mobilization x10    AMERICO participated in dynamic functional therapeutic activities to improve functional performance for 22 minutes, including:    Dynamic warm up  bike 5 min  10 yards x 2  - knee to chest 2x10  - heel to butt  - sweeps  - lateral lunge  - Lateral walks  - monster walks  RFESS glute dominate 3x10  Y Balance assessment         Patient Education and Home Exercises       Education provided:   - Pt educated on performing Ant Pelvic tilt if attempting squatting.     Written Home Exercises Provided: Patient instructed to cont prior HEP. Exercises were reviewed and AMERICO was able to demonstrate them prior to the end of the session.  AMERICO demonstrated good  understanding of the education provided. See EMR under Patient Instructions for exercises provided during therapy sessions    Assessment   Americo was progressed with glute strengthening exercises. He still has some pain with end range hip ER/IR but he was made better after hip external range of motion exercises. He has been able to return to squatting and exercising with minimal pain. He needs to further increase his hip strength as his Y balance performance as poor. Plan to progress as tolerated.       AMERICO Is progressing well towards his goals.   Pt prognosis is Good.     Pt will continue to benefit from skilled outpatient physical therapy to address the deficits listed in the problem list box on initial evaluation, provide pt/family education and to maximize pt's level of independence in the home and community environment.     Pt's spiritual, cultural and educational needs considered and pt agreeable to plan of care and goals.     Anticipated barriers to physical therapy: none    Goals:   STG (6 Weeks)  Pt will be independent with Initial home exercise program in  order to facilitate Physical Therapy treatment  Pt will reduce worst pain to 4/10 in order to perform ADLs  Pt will improve hip flexion and IR by 5' in order to perform recreational tasks  Pt will improve hip External rotation and flexion strength to >80% contralateral LE in order to perform recreational tasks      LTG(12weeks)  Pt will be independent c final home exercise program in order to DC to home program  Pt will improve FOTO limitation to 76% indicative of overall improvement in function   Pt will improve worst pain to 1/10 in order to return to previous level of function   Pt will improve hip External rotation and flexion strength to >90% contralateral LE in order to perform recreational tasks     Plan     Continue with above.     Brielle Story, PT, DPT

## 2024-07-12 NOTE — PROGRESS NOTES
[facility]   Physical Therapy Treatment Note      Name: Americo Ulloa  [facility] Number: [id number]    Therapy Diagnosis:   Encounter Diagnoses   Name Primary?    Abnormal gait Yes    Decreased range of motion     Decreased strength          Physician: Wes Sullivan DO    Visit Date: 7/12/2024    Physician Orders: PT Eval and Treat   Medical Diagnosis from Referral: M25.551 (ICD-10-CM) - Pain of right hip   Evaluation Date: 6/5/2024  Authorization Period Expiration: 5/6/2024  Plan of Care Expiration: 8/30/2024  Progress Note Due: 7/5/2024  Visit # / Visits authorized: 5/ 10 + eval   FOTO: 6/5/2024     Time In: 9:02 am  Time Out: 10:00 am  Total Appointment Time (timed & untimed codes): 58 minutes     Precautions: Standard     Subjective     Pt reports: He is feeling good with less pinching. Still having soreness after his workouts  He was compliant with home exercise program.  Response to previous treatment: less pain   Functional change: less pain during workouts    Pain: 1/10  Location: right hip      Objective      Objective Measures updated at progress report unless specified.     Hip range of motion:  Left  IR 35  ER 40    Right   IR 25(initiation of tightness) 30 pain  ER 45    Y Balance:   R = 48cm   L = 54 cm    Treatment     AMERICO received the treatments listed below:      Bold = Performed    AMERICO received the following manual therapy techniques: Joint mobilizations were applied to the: Right hip for 8 minutes, including:  LAD grade 4-5 mobilization  True Hip distraction grade 4-5 mobilization   Mobilization with movement into hip flexion   Hip distraction with IR mobilization        AMERICO participated in neuromuscular re-education activities to improve: Coordination, Kinesthetic, Sense, Proprioception, and Posture for 20 minutes. The following activities were included:    Prone ER IR 2x10  90/90 hip stretch 10x each  Prone 90' IR/ER perturbations   Pretzel hip flexed  2x12  Pretzel in side lying with range into internal rotation 2x12  Y strap hip ER 3x10 13#  Y strap shot putt 2x10 13#    Pt education    Not today:  Hip thrust SL 3x10  Side plank with Hip abduction 3x10  Kneel on stool ER RTB 3x10  Hernandez pose hips ER/IR x20  Modified side plank clamshell 2x10   Seated hip IR/ER hip mobilization x10    AMERICO participated in dynamic functional therapeutic activities to improve functional performance for 30 minutes, including:    Dynamic warm up  bike 5 min  10 yards x 2  - knee to chest 2x10  - heel to butt  - sweeps  - lateral lunge  - Lateral walks  - monster walks  Shot putt throw practice   RFESS glute dominate 3x10  Shuttle hip extension 3x10 2 cords  SL squat 2x6        Patient Education and Home Exercises       Education provided:   - Pt educated on performing Ant Pelvic tilt if attempting squatting.     Written Home Exercises Provided: Patient instructed to cont prior HEP. Exercises were reviewed and AMERICO was able to demonstrate them prior to the end of the session.  AMERICO demonstrated good  understanding of the education provided. See EMR under Patient Instructions for exercises provided during therapy sessions    Assessment   Americo has been much less irritable with exercises. He practiced shot putt throws today and did have pain if there was too much internal rotation of his hip. He still has issues with his ability to perform Y balance. He is making great progress. Needs to further increase his glute strength. Plan to progress as tolerated.       AMERCIO Is progressing well towards his goals.   Pt prognosis is Good.     Pt will continue to benefit from skilled outpatient physical therapy to address the deficits listed in the problem list box on initial evaluation, provide pt/family education and to maximize pt's level of independence in the home and community environment.     Pt's spiritual, cultural and educational needs considered and pt agreeable to plan of  care and goals.     Anticipated barriers to physical therapy: none    Goals:   STG (6 Weeks)  Pt will be independent with Initial home exercise program in order to facilitate Physical Therapy treatment  Pt will reduce worst pain to 4/10 in order to perform ADLs  Pt will improve hip flexion and IR by 5' in order to perform recreational tasks  Pt will improve hip External rotation and flexion strength to >80% contralateral LE in order to perform recreational tasks      LTG(12weeks)  Pt will be independent c final home exercise program in order to DC to home program  Pt will improve FOTO limitation to 76% indicative of overall improvement in function   Pt will improve worst pain to 1/10 in order to return to previous level of function   Pt will improve hip External rotation and flexion strength to >90% contralateral LE in order to perform recreational tasks     Plan     Continue with above.     Indio Quarles, PT, DPT

## 2024-07-19 ENCOUNTER — CLINICAL SUPPORT (OUTPATIENT)
Dept: REHABILITATION | Facility: OTHER | Age: 19
End: 2024-07-19
Payer: COMMERCIAL

## 2024-07-19 DIAGNOSIS — R26.9 ABNORMAL GAIT: Primary | ICD-10-CM

## 2024-07-19 DIAGNOSIS — M25.60 DECREASED RANGE OF MOTION: ICD-10-CM

## 2024-07-19 DIAGNOSIS — R53.1 DECREASED STRENGTH: ICD-10-CM

## 2024-07-19 PROCEDURE — 97140 MANUAL THERAPY 1/> REGIONS: CPT | Mod: PN

## 2024-07-19 PROCEDURE — 97112 NEUROMUSCULAR REEDUCATION: CPT | Mod: PN

## 2024-07-19 PROCEDURE — 97530 THERAPEUTIC ACTIVITIES: CPT | Mod: PN

## 2024-07-19 NOTE — PROGRESS NOTES
[facility]   Physical Therapy Treatment Note      Name: Americo Ulloa  Clinic Number: [id number]    Therapy Diagnosis:   Encounter Diagnoses   Name Primary?    Abnormal gait Yes    Decreased range of motion     Decreased strength      Physician: Wes Sullivan DO    Visit Date: 7/19/2024    Physician Orders: PT Eval and Treat   Medical Diagnosis from Referral: M25.551 (ICD-10-CM) - Pain of right hip   Evaluation Date: 6/5/2024  Authorization Period Expiration: 5/6/2024  Plan of Care Expiration: 8/30/2024  Progress Note Due: 8/5/2024  Visit # / Visits authorized: 6/ 10 + eval   FOTO: 6/5/2024     Time In: 9:02 am  Time Out: 10:00 am  Total Appointment Time (timed & untimed codes): 58 minutes     Precautions: Standard     Subjective     Pt reports: Doing good, still feels it when he rotates. But the squatting is getting better.  He was compliant with home exercise program.  Response to previous treatment: less pain   Functional change: less pain during workouts    Pain: 1/10  Location: right hip      Objective      Objective Measures updated at progress report unless specified.     Hip range of motion:  Left  IR 35  ER 40    Right   IR 25(initiation of tightness) 30 pain  ER 45    7/19/2024:  Y Balance Ant:   R = 52cm   L = 56cm    Post Lat:  Right: 103  Left:114    Post Med:  Right: 93  Left: 109   Treatment     AMERICO received the treatments listed below:      Bold = Performed    AMERICO received the following manual therapy techniques: Joint mobilizations were applied to the: Right hip for 8 minutes, including:    True Hip distraction grade 4-5 mobilization   Hip distraction with IR mobilization     Not today:  LAD grade 4-5 mobilization  Mobilization with movement into hip flexion      AMERICO participated in neuromuscular re-education activities to improve: Coordination, Kinesthetic, Sense, Proprioception, and Posture for 20 minutes. The following activities were  included:    Prone ER IR 2x10  90/90 hip stretch 10x each  Prone 90' IR/ER perturbations   Y strap hip ER 3x10 13#  Y strap shot putt 2x10 13#  Pt education    Not today:  Pretzel hip flexed 2x12  Pretzel in side lying with range into internal rotation 2x12  Hip thrust SL 3x10  Side plank with Hip abduction 3x10  Kneel on stool ER RTB 3x10  Hernandez pose hips ER/IR x20  Modified side plank clamshell 2x10   Seated hip IR/ER hip mobilization x10    AMERICO participated in dynamic functional therapeutic activities to improve functional performance for 30 minutes, including:    Dynamic warm up  bike 5 min  10 yards x 2  - knee to chest 2x10  - heel to butt  - sweeps  - lateral lunge  - Lateral walks  - monster walks  Shot putt throw practice   RFESS glute dominate 3x10    Not today:  Shuttle hip extension 3x10 2 cords  SL squat 2x6        Patient Education and Home Exercises       Education provided:   - Pt educated on performing Ant Pelvic tilt if attempting squatting.     Written Home Exercises Provided: Patient instructed to cont prior HEP. Exercises were reviewed and AMERICO was able to demonstrate them prior to the end of the session.  AMERICO demonstrated good  understanding of the education provided. See EMR under Patient Instructions for exercises provided during therapy sessions    Assessment   Americo presents today with reduced but continued hip pain with resisted ER and limitations into hip IR. Y balance post med and lat shows glute deficit on Right. Ant shows improvement. Focus continues to joint mobility, motor control, and strength progression.       AMERICO Is progressing well towards his goals.   Pt prognosis is Good.     Pt will continue to benefit from skilled outpatient physical therapy to address the deficits listed in the problem list box on initial evaluation, provide pt/family education and to maximize pt's level of independence in the home and community environment.     Pt's spiritual, cultural  and educational needs considered and pt agreeable to plan of care and goals.     Anticipated barriers to physical therapy: none    Goals:   STG (6 Weeks)  Pt will be independent with Initial home exercise program in order to facilitate Physical Therapy treatment  Pt will reduce worst pain to 4/10 in order to perform ADLs  Pt will improve hip flexion and IR by 5' in order to perform recreational tasks  Pt will improve hip External rotation and flexion strength to >80% contralateral LE in order to perform recreational tasks      LTG(12weeks)  Pt will be independent c final home exercise program in order to DC to home program  Pt will improve FOTO limitation to 76% indicative of overall improvement in function   Pt will improve worst pain to 1/10 in order to return to previous level of function   Pt will improve hip External rotation and flexion strength to >90% contralateral LE in order to perform recreational tasks     Plan     Continue with above.     Brielle Story, PT, DPT

## 2024-08-02 ENCOUNTER — CLINICAL SUPPORT (OUTPATIENT)
Dept: REHABILITATION | Facility: OTHER | Age: 19
End: 2024-08-02
Payer: COMMERCIAL

## 2024-08-02 DIAGNOSIS — R53.1 DECREASED STRENGTH: ICD-10-CM

## 2024-08-02 DIAGNOSIS — M25.60 DECREASED RANGE OF MOTION: ICD-10-CM

## 2024-08-02 DIAGNOSIS — R26.9 ABNORMAL GAIT: Primary | ICD-10-CM

## 2024-08-02 PROCEDURE — 97112 NEUROMUSCULAR REEDUCATION: CPT | Mod: PN

## 2024-08-02 PROCEDURE — 97140 MANUAL THERAPY 1/> REGIONS: CPT | Mod: PN

## 2024-08-02 NOTE — PROGRESS NOTES
[facility]   Physical Therapy Treatment Note      Name: Americo Ulloa  Clinic Number: [id number]    Therapy Diagnosis:   Encounter Diagnoses   Name Primary?    Abnormal gait Yes    Decreased range of motion     Decreased strength        Physician: Wes Sullivan DO    Visit Date: 8/2/2024    Physician Orders: PT Eval and Treat   Medical Diagnosis from Referral: M25.551 (ICD-10-CM) - Pain of right hip   Evaluation Date: 6/5/2024  Authorization Period Expiration: 5/6/2024  Plan of Care Expiration: 8/30/2024  Progress Note Due: 8/5/2024  Visit # / Visits authorized: 6/ 10 + eval   FOTO: 6/5/2024     Time In: 8:16 am  Time Out: 9:15 am  Total Appointment Time (timed & untimed codes): 59 minutes     Precautions: Standard     Subjective     Pt reports: His hip is a little sore after walking [place] the beach  He was compliant with home exercise program.  Response to previous treatment: less pain   Functional change: less pain during workouts    Pain: 1/10  Location: right hip      Objective      Objective Measures updated at progress report unless specified.     Hip range of motion:  Left  IR 35  ER 40      8/2/2024:  Y Balance Ant:   R = 50cm   L = 56cm      Post Lat:  Right: 101  Left:105    Post Med:  Right: 101  Left: 112    Right ROM  IR 30   ER 45  Extension 5: 15 after session    Treatment     AMERICO received the treatments listed below:      Bold = Performed    AMERICO received the following manual therapy techniques: Joint mobilizations were applied to the: Right hip for 11 minutes, including:    True Hip distraction grade 4-5 mobilization   Hip distraction with IR mobilization mobilization grade 3  Anterior     Not today:  LAD grade 4-5 mobilization  Mobilization with movement into hip flexion      AMERICO participated in neuromuscular re-education activities to improve: Coordination, Kinesthetic, Sense, Proprioception, and Posture for 48 minutes. The following activities were  included:    Assessment as above  Self Hip extension 15x3s Band  Prone hip extension 10x3s   Y strap hip ER 3x10 13#  Shuttle hip extension 3x8 3 cords  Side plank with hip abduction 3x10  2x20 Stool rotation   Lateral step down 3x5 10in  Pt education      Not today:  Prone ER IR 2x10  90/90 hip stretch 10x each  Prone 90' IR/ER perturbations   Y strap shot putt 2x10 13#    Pretzel hip flexed 2x12  Pretzel in side lying with range into internal rotation 2x12  Hip thrust SL 3x10  Side plank with Hip abduction 3x10  Kneel on stool ER RTB 3x10  Hernandez pose hips ER/IR x20  Modified side plank clamshell 2x10   Seated hip IR/ER hip mobilization x10    AMERICO participated in dynamic functional therapeutic activities to improve functional performance for 0 minutes, including:    Dynamic warm up  bike 5 min  10 yards x 2  - knee to chest 2x10  - heel to butt  - sweeps  - lateral lunge  - Lateral walks  - monster walks  Shot putt throw practice   RFESS glute dominate 3x10    Not today:  Shuttle hip extension 3x10 2 cords  SL squat 2x6        Patient Education and Home Exercises       Education provided:   - Pt educated on performing Ant Pelvic tilt if attempting squatting.     Written Home Exercises Provided: Patient instructed to cont prior HEP. Exercises were reviewed and AMERICO was able to demonstrate them prior to the end of the session.  AMERICO demonstrated good  understanding of the education provided. See EMR under Patient Instructions for exercises provided during therapy sessions    Assessment   Americo has decreased hip extension but his internal rotation is better. He was able to increase his hip extension during the session. Overall he is having less pain, but still unable to pass his Y balance assessment for symmetry. He needs to increase his hip strength globally. He was educated to start increasing his activity level. Plan to progress as tolerated.       AMERICO Is progressing well towards his goals.   Pt  prognosis is Good.     Pt will continue to benefit from skilled outpatient physical therapy to address the deficits listed in the problem list box on initial evaluation, provide pt/family education and to maximize pt's level of independence in the home and community environment.     Pt's spiritual, cultural and educational needs considered and pt agreeable to plan of care and goals.     Anticipated barriers to physical therapy: none    Goals:   STG (6 Weeks)  Pt will be independent with Initial home exercise program in order to facilitate Physical Therapy treatment  Pt will reduce worst pain to 4/10 in order to perform ADLs  Pt will improve hip flexion and IR by 5' in order to perform recreational tasks  Pt will improve hip External rotation and flexion strength to >80% contralateral LE in order to perform recreational tasks      LTG(12weeks)  Pt will be independent c final home exercise program in order to DC to home program  Pt will improve FOTO limitation to 76% indicative of overall improvement in function   Pt will improve worst pain to 1/10 in order to return to previous level of function   Pt will improve hip External rotation and flexion strength to >90% contralateral LE in order to perform recreational tasks     Plan     Continue with above.     Indio Quarles, PT, DPT

## 2024-08-08 ENCOUNTER — CLINICAL SUPPORT (OUTPATIENT)
Dept: REHABILITATION | Facility: OTHER | Age: 19
End: 2024-08-08
Payer: COMMERCIAL

## 2024-08-08 DIAGNOSIS — R26.9 ABNORMAL GAIT: Primary | ICD-10-CM

## 2024-08-08 DIAGNOSIS — R53.1 DECREASED STRENGTH: ICD-10-CM

## 2024-08-08 DIAGNOSIS — M25.60 DECREASED RANGE OF MOTION: ICD-10-CM

## 2024-08-08 PROCEDURE — 97112 NEUROMUSCULAR REEDUCATION: CPT | Mod: PN

## 2024-08-08 PROCEDURE — 97530 THERAPEUTIC ACTIVITIES: CPT | Mod: PN

## 2024-08-08 PROCEDURE — 97140 MANUAL THERAPY 1/> REGIONS: CPT | Mod: PN

## 2024-08-08 NOTE — PROGRESS NOTES
[facility]   Physical Therapy Treatment Note      Name: Americo Ulloa  Clinic Number: [id number]    Therapy Diagnosis:   No diagnosis found.      Physician: Wes Sullivan DO    Visit Date: 8/8/2024    Physician Orders: PT Eval and Treat   Medical Diagnosis from Referral: M25.551 (ICD-10-CM) - Pain of right hip   Evaluation Date: 6/5/2024  Authorization Period Expiration: 5/6/2024  Plan of Care Expiration: 8/30/2024  Progress Note Due: 8/5/2024  Visit # / Visits authorized: 6/ 10 + eval   FOTO: 6/5/2024     Time In: 8:16 am  Time Out: 9:15 am  Total Appointment Time (timed & untimed codes): 59 minutes     Precautions: Standard     Subjective     Pt reports: His hip is a little sore after walking on the beach  He was compliant with home exercise program.  Response to previous treatment: less pain   Functional change: less pain during workouts    Pain: 1/10  Location: right hip      Objective      Objective Measures updated at progress report unless specified.     Hip range of motion:  Left  IR 35  ER 40      8/2/2024:  Y Balance Ant:   R = 50cm   L = 56cm      Post Lat:  Right: 101  Left:105    Post Med:  Right: 101  Left: 112    Right ROM  IR 30   ER 45  Extension 5: 15 after session    Treatment     AMERICO received the treatments listed below:      Bold = Performed    AMERICO received the following manual therapy techniques: Joint mobilizations were applied to the: Right hip for 11 minutes, including:    True Hip distraction grade 4-5 mobilization   Hip distraction with IR mobilization mobilization grade 3  Anterior     Not today:  LAD grade 4-5 mobilization  Mobilization with movement into hip flexion      AMERICO participated in neuromuscular re-education activities to improve: Coordination, Kinesthetic, Sense, Proprioception, and Posture for 48 minutes. The following activities were included:    Assessment as above  Self Hip extension 15x3s Band  Prone hip extension 10x3s   Y  strap hip ER 3x10 13#  Shuttle hip extension 3x8 3 cords  Side plank with hip abduction 3x10  2x20 Stool rotation   Lateral step down 3x5 10in  Pt education      Not today:  Prone ER IR 2x10  90/90 hip stretch 10x each  Prone 90' IR/ER perturbations   Y strap shot putt 2x10 13#    Pretzel hip flexed 2x12  Pretzel in side lying with range into internal rotation 2x12  Hip thrust SL 3x10  Side plank with Hip abduction 3x10  Kneel on stool ER RTB 3x10  Hernandez pose hips ER/IR x20  Modified side plank clamshell 2x10   Seated hip IR/ER hip mobilization x10    AMERICO participated in dynamic functional therapeutic activities to improve functional performance for 0 minutes, including:    Dynamic warm up  bike 5 min  10 yards x 2  - knee to chest 2x10  - heel to butt  - sweeps  - lateral lunge  - Lateral walks  - monster walks  Shot putt throw practice   RFESS glute dominate 3x10    Not today:  Shuttle hip extension 3x10 2 cords  SL squat 2x6        Patient Education and Home Exercises       Education provided:   - Pt educated on performing Ant Pelvic tilt if attempting squatting.     Written Home Exercises Provided: Patient instructed to cont prior HEP. Exercises were reviewed and AMERICO was able to demonstrate them prior to the end of the session.  AMERICO demonstrated good  understanding of the education provided. See EMR under Patient Instructions for exercises provided during therapy sessions    Assessment   Americo has decreased hip extension but his internal rotation is better. He was able to increase his hip extension during the session. Overall he is having less pain, but still unable to pass his Y balance assessment for symmetry. He needs to increase his hip strength globally. He was educated to start increasing his activity level. Plan to progress as tolerated.       AMERICO Is progressing well towards his goals.   Pt prognosis is Good.     Pt will continue to benefit from skilled outpatient physical therapy to  address the deficits listed in the problem list box on initial evaluation, provide pt/family education and to maximize pt's level of independence in the home and community environment.     Pt's spiritual, cultural and educational needs considered and pt agreeable to plan of care and goals.     Anticipated barriers to physical therapy: none    Goals:   STG (6 Weeks)  Pt will be independent with Initial home exercise program in order to facilitate Physical Therapy treatment  Pt will reduce worst pain to 4/10 in order to perform ADLs  Pt will improve hip flexion and IR by 5' in order to perform recreational tasks  Pt will improve hip External rotation and flexion strength to >80% contralateral LE in order to perform recreational tasks      LTG(12weeks)  Pt will be independent c final home exercise program in order to DC to home program  Pt will improve FOTO limitation to 76% indicative of overall improvement in function   Pt will improve worst pain to 1/10 in order to return to previous level of function   Pt will improve hip External rotation and flexion strength to >90% contralateral LE in order to perform recreational tasks     Plan     Continue with above.     Indio Quarles, PT, DPT

## 2024-08-08 NOTE — PROGRESS NOTES
[facility]   Physical Therapy Treatment Note      Name: Americo Ulloa  Clinic Number: [id number]    Therapy Diagnosis:   Encounter Diagnoses   Name Primary?    Abnormal gait Yes    Decreased range of motion     Decreased strength          Physician: Wes Sullivan DO    Visit Date: 8/8/2024    Physician Orders: PT Eval and Treat   Medical Diagnosis from Referral: M25.551 (ICD-10-CM) - Pain of right hip   Evaluation Date: 6/5/2024  Authorization Period Expiration: 5/6/2024  Plan of Care Expiration: 8/30/2024  Progress Note Due: 8/5/2024  Visit # / Visits authorized: 8/ 10 + eval   FOTO: 6/5/2024     Time In: 5:50 am  Time Out: 6:50 am  Total Appointment Time (timed & untimed codes): 60 minutes     Precautions: Standard     Subjective     Pt reports: He has been feeling better, still soreness in hip   He was compliant with home exercise program.  Response to previous treatment: less pain   Functional change: less pain during workouts    Pain: 1/10  Location: right hip      Objective      Objective Measures updated at progress report unless specified.     Hip range of motion:  Left  IR 35  ER 40      8/2/2024:  Y Balance Ant:   R = 50cm   L = 56cm      Post Lat:  Right: 101  Left:105    Post Med:  Right: 101  Left: 112    Right ROM  IR 30   ER 45  Extension 5: 15 after session    Treatment     AMERICO received the treatments listed below:      Bold = Performed    AMERICO received the following manual therapy techniques: Joint mobilizations were applied to the: Right hip for 8 minutes, including:    True Hip distraction grade 4-5 mobilization   Hip distraction with IR mobilization mobilization grade 3  Anterior     Not today:  LAD grade 4-5 mobilization  Mobilization with movement into hip flexion      AMERICO participated in neuromuscular re-education activities to improve: Coordination, Kinesthetic, Sense, Proprioception, and Posture for 18 minutes. The following activities were  included:    Side plank with hip abduction 3x10  Prone hip extension 10x3s   Y strap hip ER 3x10 13#  Lateral step down 3x5 10in  Pt education      Not today:  Prone ER IR 2x10  90/90 hip stretch 10x each  Prone 90' IR/ER perturbations   Y strap shot putt 2x10 13#    Pretzel hip flexed 2x12  Pretzel in side lying with range into internal rotation 2x12  Hip thrust SL 3x10  Side plank with Hip abduction 3x10  Kneel on stool ER RTB 3x10  Hernandez pose hips ER/IR x20  Modified side plank clamshell 2x10   Seated hip IR/ER hip mobilization x10    AMERICO participated in dynamic functional therapeutic activities to improve functional performance for 34 minutes, including:    Dynamic warm up  bike 5 min  10 yards x 2  - knee to chest 2x10  - heel to butt  - sweeps  - lateral lunge  - Lateral walks  - monster walks  Shot putt throw practice   RFESS glute dominate 3x10  Shuttle hip extension 3x10 2 cords  Snatches 5x3 95#      Not today:    SL squat 2x6        Patient Education and Home Exercises       Education provided:   - Pt educated on performing Ant Pelvic tilt if attempting squatting.     Written Home Exercises Provided: Patient instructed to cont prior HEP. Exercises were reviewed and AMERICO was able to demonstrate them prior to the end of the session.  AMERICO demonstrated good  understanding of the education provided. See EMR under Patient Instructions for exercises provided during therapy sessions    Assessment   Americo was progressed with snatches as that is an exercise he must routinely perform. He did have pain with it, but felt better when his stance was adjusted. He is having less pain overall and demonstrating good strength progress. Plan to progress as tolerated.       AMERICO Is progressing well towards his goals.   Pt prognosis is Good.     Pt will continue to benefit from skilled outpatient physical therapy to address the deficits listed in the problem list box on initial evaluation, provide pt/family  education and to maximize pt's level of independence in the home and community environment.     Pt's spiritual, cultural and educational needs considered and pt agreeable to plan of care and goals.     Anticipated barriers to physical therapy: none    Goals:   STG (6 Weeks)  Pt will be independent with Initial home exercise program in order to facilitate Physical Therapy treatment  Pt will reduce worst pain to 4/10 in order to perform ADLs  Pt will improve hip flexion and IR by 5' in order to perform recreational tasks  Pt will improve hip External rotation and flexion strength to >80% contralateral LE in order to perform recreational tasks      LTG(12weeks)  Pt will be independent c final home exercise program in order to DC to home program  Pt will improve FOTO limitation to 76% indicative of overall improvement in function   Pt will improve worst pain to 1/10 in order to return to previous level of function   Pt will improve hip External rotation and flexion strength to >90% contralateral LE in order to perform recreational tasks     Plan     Continue with above.     Indio Quarles, PT, DPT

## 2024-08-19 ENCOUNTER — CLINICAL SUPPORT (OUTPATIENT)
Dept: REHABILITATION | Facility: OTHER | Age: 19
End: 2024-08-19
Payer: COMMERCIAL

## 2024-08-19 DIAGNOSIS — R26.9 ABNORMAL GAIT: Primary | ICD-10-CM

## 2024-08-19 DIAGNOSIS — R53.1 DECREASED STRENGTH: ICD-10-CM

## 2024-08-19 DIAGNOSIS — M25.60 DECREASED RANGE OF MOTION: ICD-10-CM

## 2024-08-19 PROCEDURE — 97112 NEUROMUSCULAR REEDUCATION: CPT | Mod: PN

## 2024-08-19 PROCEDURE — 97140 MANUAL THERAPY 1/> REGIONS: CPT | Mod: PN

## 2024-08-19 PROCEDURE — 97530 THERAPEUTIC ACTIVITIES: CPT | Mod: PN

## 2024-08-19 NOTE — PROGRESS NOTES
[facility]   Physical Therapy Treatment Note      Name: Americo Ulloa  [facility] Number: [id number]    Therapy Diagnosis:   Encounter Diagnoses   Name Primary?    Abnormal gait Yes    Decreased range of motion     Decreased strength          Physician: Wes Sullivan DO    Visit Date: 8/19/2024    Physician Orders: PT Eval and Treat   Medical Diagnosis from Referral: M25.551 (ICD-10-CM) - Pain of right hip   Evaluation Date: 6/5/2024  Authorization Period Expiration: 5/6/2024  Plan of Care Expiration: 8/30/2024  Progress Note Due: 8/5/2024  Visit # / Visits authorized: 9/ 10 + eval   FOTO: 6/5/2024     Time In: 1:35  Time Out: 2:30  Total Appointment Time (timed & untimed codes): 55 minutes     Precautions: Standard     Subjective     Pt reports: He did some cleans and it felt good  He was compliant with home exercise program.  Response to previous treatment: less pain   Functional change: less pain during workouts    Pain: 1/10  Location: right hip      Objective      Objective Measures updated at progress report unless specified.     Hip range of motion:  Left  IR 35  ER 40      8/2/2024:  Y Balance Ant:   R = 50cm   L = 56cm      Post Lat:  Right: 101  Left:105    Post Med:  Right: 101  Left: 112    Right ROM  IR 30   ER 45  Extension 5: 15 after session    Dynamometer 8/19/2024:  - R ER: 23.7, 23.1  - L ER: 36.6, 37.9  - R extension: 52.6, 67, 84  - L extension: 98, 72, 89  - R abduction: 41.9  - L abduction: 48.4    YBT 8/19/2024:  - R anterior: 51  - L anterior: 63  - R posteromedial: 108  - L posteromedial: 114  - R posterolateral: 109  - L posterolateral: 108    YBT after exercises 8/19/2024:  - R anterior: 52, 54, 52, 53, 52      Treatment     AMERICO received the treatments listed below:      Bold = Performed    AMERICO received the following manual therapy techniques: Joint mobilizations were applied to the: Right hip for 8 minutes, including:    True Hip distraction grade  4-5 mobilization   Hip distraction with IR mobilization mobilization grade 3  Anterior     Not today:  LAD grade 4-5 mobilization  Mobilization with movement into hip flexion      AMERICO participated in neuromuscular re-education activities to improve: Coordination, Kinesthetic, Sense, Proprioception, and Posture for 17 minutes. The following activities were included:    Side plank with hip abduction 3x10 Holds  Manual ER resistance 3x5  Y strap hip ER 3x6 17#  Y balance practice 3x5        Not today:  Prone hip extension 10x3s   Lateral step down 3x5 10in  Pt education        Prone ER IR 2x10  90/90 hip stretch 10x each  Prone 90' IR/ER perturbations   Y strap shot putt 2x10 13#    Pretzel hip flexed 2x12  Pretzel in side lying with range into internal rotation 2x12  Hip thrust SL 3x10  Side plank with Hip abduction 3x10  Kneel on stool ER RTB 3x10  Hernandez pose hips ER/IR x20  Modified side plank clamshell 2x10   Seated hip IR/ER hip mobilization x10    AMERICO participated in dynamic functional therapeutic activities to improve functional performance for 30 minutes, including:    Assessment as above  SL Sit to stand 3x10 18in          Dynamic warm up  bike 5 min  10 yards x 2  - knee to chest 2x10  - heel to butt  - sweeps  - lateral lunge  - Lateral walks  - monster walks  Shot putt throw practice   RFESS glute dominate 3x10  Shuttle hip extension 3x10 2 cords  Snatches 5x3 95#      Not today:  SL squat 2x6        Patient Education and Home Exercises       Education provided:   - Pt educated on performing Ant Pelvic tilt if attempting squatting.     Written Home Exercises Provided: Patient instructed to cont prior HEP. Exercises were reviewed and AMERICO was able to demonstrate them prior to the end of the session.  AMERICO demonstrated good  understanding of the education provided. See EMR under Patient Instructions for exercises provided during therapy sessions    Assessment   Americo continues to be  challenged with anterior Y balance exercise. He has not yet pass anterior but is progressing. He continues to be very challenged with hip strengthening exercises. He was assessed and found to be progressing with strength, but still limited in hip ER.Plan to progress as tolerated.       YAA Is progressing well towards his goals.   Pt prognosis is Good.     Pt will continue to benefit from skilled outpatient physical therapy to address the deficits listed in the problem list box on initial evaluation, provide pt/family education and to maximize pt's level of independence in the home and community environment.     Pt's spiritual, cultural and educational needs considered and pt agreeable to plan of care and goals.     Anticipated barriers to physical therapy: none    Goals:   STG (6 Weeks)  Pt will be independent with Initial home exercise program in order to facilitate Physical Therapy treatment  Pt will reduce worst pain to 4/10 in order to perform ADLs  Pt will improve hip flexion and IR by 5' in order to perform recreational tasks  Pt will improve hip External rotation and flexion strength to >80% contralateral LE in order to perform recreational tasks      LTG(12weeks)  Pt will be independent c final home exercise program in order to DC to home program  Pt will improve FOTO limitation to 76% indicative of overall improvement in function   Pt will improve worst pain to 1/10 in order to return to previous level of function   Pt will improve hip External rotation and flexion strength to >90% contralateral LE in order to perform recreational tasks     Plan     Continue with above.     Indio Quarles, PT, DPT

## 2024-08-26 ENCOUNTER — CLINICAL SUPPORT (OUTPATIENT)
Dept: REHABILITATION | Facility: OTHER | Age: 19
End: 2024-08-26
Payer: COMMERCIAL

## 2024-08-26 DIAGNOSIS — R26.9 ABNORMAL GAIT: Primary | ICD-10-CM

## 2024-08-26 DIAGNOSIS — M25.60 DECREASED RANGE OF MOTION: ICD-10-CM

## 2024-08-26 DIAGNOSIS — R53.1 DECREASED STRENGTH: ICD-10-CM

## 2024-08-26 PROCEDURE — 97530 THERAPEUTIC ACTIVITIES: CPT | Mod: PN

## 2024-08-26 PROCEDURE — 97112 NEUROMUSCULAR REEDUCATION: CPT | Mod: PN

## 2024-08-26 NOTE — PROGRESS NOTES
[facility]   Physical Therapy Treatment Note      Name: Americo Ulloa  [facility] Number: [id number]    Therapy Diagnosis:   Encounter Diagnoses   Name Primary?    Abnormal gait Yes    Decreased range of motion     Decreased strength          Physician: Wes Sullivan DO    Visit Date: 8/26/2024    Physician Orders: PT Eval and Treat   Medical Diagnosis from Referral: M25.551 (ICD-10-CM) - Pain of right hip   Evaluation Date: 6/5/2024  Authorization Period Expiration: 5/6/2024  Plan of Care Expiration: 8/30/2024  Progress Note Due: 8/5/2024  Visit # / Visits authorized: 10/ 10 + eval   FOTO: 6/5/2024     Time In: 1:30  Time Out: 2:30  Total Appointment Time (timed & untimed codes): 60 minutes     Precautions: Standard     Subjective     Pt reports: He has been feeling better, not as much pain, just soreness  He was compliant with home exercise program.  Response to previous treatment: less pain   Functional change: less pain during workouts    Pain: 1/10  Location: right hip      Objective      Objective Measures updated at progress report unless specified.     Hip range of motion:  Left  IR 35  ER 40      8/2/2024:  Y Balance Ant:   R = 50cm   L = 56cm      Post Lat:  Right: 101  Left:105    Post Med:  Right: 101  Left: 112    Right ROM  IR 30   ER 45  Extension 5: 15 after session    Dynamometer 8/19/2024:  - R ER: 23.7, 23.1  - L ER: 36.6, 37.9  - R extension: 52.6, 67, 84  - L extension: 98, 72, 89  - R abduction: 41.9  - L abduction: 48.4    YBT 8/19/2024:  - R anterior: 51  - L anterior: 63  - R posteromedial: 108  - L posteromedial: 114  - R posterolateral: 109  - L posterolateral: 108    YBT after exercises 8/19/2024:  - R anterior: 52, 54, 52, 53, 52      Treatment     AMERICO received the treatments listed below:      Bold = Performed    AMERICO received the following manual therapy techniques: Joint mobilizations were applied to the: Right hip for 0 minutes, including:    True  Hip distraction grade 4-5 mobilization   Hip distraction with IR mobilization mobilization grade 3  Anterior     Not today:  LAD grade 4-5 mobilization  Mobilization with movement into hip flexion      AMERICO participated in neuromuscular re-education activities to improve: Coordination, Kinesthetic, Sense, Proprioception, and Posture for 17 minutes. The following activities were included:    Side plank with hip abduction 3x10 Holds  Manual ER resistance 3x5  Y strap hip ER 3x6 17#          Not today:  Prone hip extension 10x3s   Lateral step down 3x5 10in  Pt education        Prone ER IR 2x10  90/90 hip stretch 10x each  Prone 90' IR/ER perturbations   Y strap shot putt 2x10 13#    Pretzel hip flexed 2x12  Pretzel in side lying with range into internal rotation 2x12  Hip thrust SL 3x10  Side plank with Hip abduction 3x10  Kneel on stool ER RTB 3x10  Hernandez pose hips ER/IR x20  Modified side plank clamshell 2x10   Seated hip IR/ER hip mobilization x10    AMERICO participated in dynamic functional therapeutic activities to improve functional performance for 43 minutes, including:      Dynamic warm up  bike 5 min  10 yards x 2  - knee to chest 2x10  - heel to butt  - sweeps  - lateral lunge  - Lateral walks  - monster walks  SL Sit to stand 3x10 18in  Shuttle hip extension 3x10 4 cords  Reverse lunge 2x10 60#  Lateral Lunges 3x8 30#          Patient Education and Home Exercises       Education provided:   - Pt educated on performing Ant Pelvic tilt if attempting squatting.     Written Home Exercises Provided: Patient instructed to cont prior HEP. Exercises were reviewed and AMERICO was able to demonstrate them prior to the end of the session.  AMERICO demonstrated good  understanding of the education provided. See EMR under Patient Instructions for exercises provided during therapy sessions    Assessment   Americo was very challenged with glute strengthening exercises. He was very fatigued and required cues to limit  his loading of his quad and to use his glutes. He still has some hip pain with certain movements but is progressing well. He has still not passed Y balance assessment or has symmetrical strength.   Plan to progress as tolerated.       YAA Is progressing well towards his goals.   Pt prognosis is Good.     Pt will continue to benefit from skilled outpatient physical therapy to address the deficits listed in the problem list box on initial evaluation, provide pt/family education and to maximize pt's level of independence in the home and community environment.     Pt's spiritual, cultural and educational needs considered and pt agreeable to plan of care and goals.     Anticipated barriers to physical therapy: none    Goals:   STG (6 Weeks)  Pt will be independent with Initial home exercise program in order to facilitate Physical Therapy treatment  Pt will reduce worst pain to 4/10 in order to perform ADLs  Pt will improve hip flexion and IR by 5' in order to perform recreational tasks  Pt will improve hip External rotation and flexion strength to >80% contralateral LE in order to perform recreational tasks      LTG(12weeks)  Pt will be independent c final home exercise program in order to DC to home program  Pt will improve FOTO limitation to 76% indicative of overall improvement in function   Pt will improve worst pain to 1/10 in order to return to previous level of function   Pt will improve hip External rotation and flexion strength to >90% contralateral LE in order to perform recreational tasks     Plan     Continue with above.     Indio Quarles, PT, DPT

## 2024-09-04 ENCOUNTER — CLINICAL SUPPORT (OUTPATIENT)
Dept: REHABILITATION | Facility: OTHER | Age: 19
End: 2024-09-04
Payer: COMMERCIAL

## 2024-09-04 DIAGNOSIS — R26.9 ABNORMAL GAIT: Primary | ICD-10-CM

## 2024-09-04 DIAGNOSIS — R53.1 DECREASED STRENGTH: ICD-10-CM

## 2024-09-04 DIAGNOSIS — M25.60 DECREASED RANGE OF MOTION: ICD-10-CM

## 2024-09-04 PROCEDURE — 97110 THERAPEUTIC EXERCISES: CPT | Mod: PN

## 2024-09-04 PROCEDURE — 97530 THERAPEUTIC ACTIVITIES: CPT | Mod: PN

## 2024-09-04 NOTE — PROGRESS NOTES
[facility]   Physical Therapy Treatment Note      Name: Americo Ulloa  [facility] Number: [id number]    Therapy Diagnosis:   Encounter Diagnoses   Name Primary?    Abnormal gait Yes    Decreased range of motion     Decreased strength        Physician: Wes Sullivan DO    Visit Date: 9/4/2024    Physician Orders: PT Eval and Treat   Medical Diagnosis from Referral: M25.551 (ICD-10-CM) - Pain of right hip   Evaluation Date: 6/5/2024  Authorization Period Expiration: 5/6/2024  Plan of Care Expiration: 8/30/2024  Progress Note Due: 8/5/2024  Visit # / Visits authorized: 11/ 22 + eval   FOTO: 6/5/2024     Time In: 1:45  Time Out: 2:50  Total Appointment Time (timed & untimed codes): 65 minutes     Precautions: Standard     Subjective     Pt reports: Hip flexors are pretty sore since he did sprints yesterday. Felt like its still different on the Right but the hip pain isnt there when he is sprinting, just muscle soreness. He still gets the hip pain when he does a squat wrong but its not nearly as bad as it was.   He was compliant with home exercise program.  Response to previous treatment: less pain   Functional change: returning to training    Pain: 1/10  Location: right hip      Objective      Objective Measures updated at progress report unless specified.     8/2/2024:  Y Balance Ant:   R = 50cm   L = 56cm    Post Lat:  Right: 101  Left:105    Post Med:  Right: 101  Left: 112    Right ROM  IR 30   ER 45  Extension 5: 15 after session    Dynamometer 8/19/2024:  - R ER: 23.7, 23.1  - L ER: 36.6, 37.9  - R extension: 52.6, 67, 84  - L extension: 98, 72, 89  - R abduction: 41.9  - L abduction: 48.4    YBT after exercises 8/19/2024:  - R anterior: 52, 54, 52, 53, 52    YBT 9/4/2024:  - R anterior: 54  - L anterior: 62  - R posteromedial: 110  - L posteromedial: 112  - R posterolateral: 116  - L posterolateral: 116      Treatment     AMERICO received the treatments listed below:      Bold =  Performed    YAA received the following manual therapy techniques: Joint mobilizations were applied to the: Right hip for 0 minutes, including:    True Hip distraction grade 4-5 mobilization   Hip distraction with IR mobilization mobilization grade 3  Anterior     Not today:  LAD grade 4-5 mobilization  Mobilization with movement into hip flexion      YAA participated in neuromuscular re-education activities to improve: Coordination, Kinesthetic, Sense, Proprioception, and Posture for 17 minutes. The following activities were included:    Manual ER resistance 3x5  Standing Y strap hip Ero IR 3x10 17#    Not today:  Side plank with hip abduction 3x10 Holds  Prone hip extension 10x3s   Lateral step down 3x5 10in  Pt education    Prone ER IR 2x10  90/90 hip stretch 10x each  Prone 90' IR/ER perturbations   Y strap shot putt 2x10 13#    Pretzel hip flexed 2x12  Pretzel in side lying with range into internal rotation 2x12  Hip thrust SL 3x10  Kneel on stool ER RTB 3x10  Hernandez pose hips ER/IR x20  Modified side plank clamshell 2x10   Seated hip IR/ER hip mobilization x10    YAA participated in dynamic functional therapeutic activities to improve functional performance for 43 minutes, including:      Dynamic warm up  bike 5 min  10 yards x 2  - knee to chest 2x10  - heel to butt  - sweeps  - lateral lunge  - Lateral walks  - monster walks    SL Sit to stand 3x10 18in 10# medicine ball  Shuttle hip extension 3x10 4 cords  Reverse lunge 2x10 60# explosive movement to single leg stance  Lateral Lunges 3x8 60# eccentric control to explosive return     Patient Education and Home Exercises       Education provided:   - Pt educated on performing Ant Pelvic tilt if attempting squatting.     Written Home Exercises Provided: Patient instructed to cont prior HEP. Exercises were reviewed and YAA was able to demonstrate them prior to the end of the session.  YAA demonstrated good  understanding of the education  provided. See EMR under Patient Instructions for exercises provided during therapy sessions    Assessment   Americo presents to PT today with fatigue and muscle soreness after training. He continues to have Right hip pain primarily with resisted ER but pain intensity has decrased and strength has improved. He continues to be limited in Right Y balance indicative of limited functional control compared to Left. Progression made today in resistance and shift towards more explosive movements with no reproduction of hip pain but soreness and fatigue did limit sets.       AMERICO Is progressing well towards his goals.   Pt prognosis is Good.     Pt will continue to benefit from skilled outpatient physical therapy to address the deficits listed in the problem list box on initial evaluation, provide pt/family education and to maximize pt's level of independence in the home and community environment.     Pt's spiritual, cultural and educational needs considered and pt agreeable to plan of care and goals.     Anticipated barriers to physical therapy: none    Goals:   STG (6 Weeks)  Pt will be independent with Initial home exercise program in order to facilitate Physical Therapy treatment  Pt will reduce worst pain to 4/10 in order to perform ADLs  Pt will improve hip flexion and IR by 5' in order to perform recreational tasks  Pt will improve hip External rotation and flexion strength to >80% contralateral LE in order to perform recreational tasks      LTG(12weeks)  Pt will be independent c final home exercise program in order to DC to home program  Pt will improve FOTO limitation to 76% indicative of overall improvement in function   Pt will improve worst pain to 1/10 in order to return to previous level of function   Pt will improve hip External rotation and flexion strength to >90% contralateral LE in order to perform recreational tasks     Plan     Continue with progression and monitor hip pain    Brielle Story, PT, DPT,  OCS

## 2024-09-30 ENCOUNTER — CLINICAL SUPPORT (OUTPATIENT)
Dept: REHABILITATION | Facility: OTHER | Age: 19
End: 2024-09-30
Payer: COMMERCIAL

## 2024-09-30 DIAGNOSIS — M25.60 DECREASED RANGE OF MOTION: ICD-10-CM

## 2024-09-30 DIAGNOSIS — R26.9 ABNORMAL GAIT: Primary | ICD-10-CM

## 2024-09-30 DIAGNOSIS — R53.1 DECREASED STRENGTH: ICD-10-CM

## 2024-09-30 PROCEDURE — 97112 NEUROMUSCULAR REEDUCATION: CPT | Mod: PN

## 2024-09-30 PROCEDURE — 97530 THERAPEUTIC ACTIVITIES: CPT | Mod: PN

## 2024-09-30 NOTE — PROGRESS NOTES
[facility]   Physical Therapy Treatment Note      Name: Americo Ulloa  Clinic Number: [id number]    Therapy Diagnosis:   Encounter Diagnoses   Name Primary?    Abnormal gait Yes    Decreased range of motion     Decreased strength          Physician: Wes Sullivan DO    Visit Date: 9/30/2024    Physician Orders: PT Eval and Treat   Medical Diagnosis from Referral: M25.551 (ICD-10-CM) - Pain of right hip   Evaluation Date: 6/5/2024  Authorization Period Expiration: 5/6/2024  Plan of Care Expiration: 8/30/2024  Progress Note Due: 8/5/2024  Visit # / Visits authorized: 12/ 22 + eval   FOTO: 6/5/2024     Time In: 1:30  Time Out: 2:30  Total Appointment Time (timed & untimed codes): 60 minutes     Precautions: Standard     Subjective     Pt reports: He missed some time from PT. He is feeling better but still having pain with his throws when he gets into hip extension.   He was compliant with home exercise program.  Response to previous treatment: less pain   Functional change: returning to training    Pain: 1/10  Location: right hip      Objective      Objective Measures updated at progress report unless specified.     8/2/2024:  Y Balance Ant:   R = 50cm   L = 56cm    Post Lat:  Right: 101  Left:105    Post Med:  Right: 101  Left: 112    Right ROM  IR 30   ER 45  Extension 5: 15 after session    Dynamometer 8/19/2024:  - R ER: 23.7, 23.1  - L ER: 36.6, 37.9  - R extension: 52.6, 67, 84  - L extension: 98, 72, 89  - R abduction: 41.9  - L abduction: 48.4    YBT after exercises 8/19/2024:  - R anterior: 52, 54, 52, 53, 52    YBT 9/4/2024:  - R anterior: 54  - L anterior: 62  - R posteromedial: 110  - L posteromedial: 112  - R posterolateral: 116  - L posterolateral: 116    9/30  Prone  - R ER: 36.3  - L ER: 42.4  Hip extension:  - R = 5  - L = 10      Treatment     AMERICO received the treatments listed below:      Bold = Performed    AMERICO received the following manual therapy  techniques: Joint mobilizations were applied to the: Right hip for 0 minutes, including:    True Hip distraction grade 4-5 mobilization   Hip distraction with IR mobilization mobilization grade 3  Anterior     Not today:  LAD grade 4-5 mobilization  Mobilization with movement into hip flexion      YAA participated in neuromuscular re-education activities to improve: Coordination, Kinesthetic, Sense, Proprioception, and Posture for 32 minutes. The following activities were included:    Assessment as above  Prone hip extension off table 3x12 right  Power band rock backs 20x RTB  Power band hip extension 20x RTB  Cable rotations 3x12 hip extension 13#  Prone Hip ER isometric [id number]       Not today:    Manual ER resistance 3x5  Standing Y strap hip Ero IR 3x10 17#  Side plank with hip abduction 3x10 Holds  Prone hip extension 10x3s   Lateral step down 3x5 10in  Prone ER IR 2x10  90/90 hip stretch 10x each  Modified side plank clamshell 2x10   Seated hip IR/ER hip mobilization x10    YAA participated in dynamic functional therapeutic activities to improve functional performance for 28 minutes, including:      Dynamic warm up  bike 5 min  10 yards x 2  - knee to chest 2x10  - heel to butt  - sweeps  - lateral lunge  - Lateral walks  - monster walks  Shuttle hip extension 3x10 4 cords        Not today    SL Sit to stand 3x10 18in 10# medicine ball  Reverse lunge 2x10 60# explosive movement to single leg stance  Lateral Lunges 3x8 60# eccentric control to explosive return     Patient Education and Home Exercises       Education provided:   - Pt educated on performing Ant Pelvic tilt if attempting squatting.     Written Home Exercises Provided: Patient instructed to cont prior HEP. Exercises were reviewed and YAA was able to demonstrate them prior to the end of the session.  YAA demonstrated good  understanding of the education provided. See EMR under Patient Instructions for exercises provided during  therapy sessions    Assessment   Americo was assessed and found to have weakness in his glute and hip ER. He was progressed with sport specific exercises that required cues to perform exercises correctly. He did not have pain with exercises and felt better at the end of the session. He needs to increase his hip extension and glute strength.   Plan to extend plan of care.       AMERICO Is progressing well towards his goals.   Pt prognosis is Good.     Pt will continue to benefit from skilled outpatient physical therapy to address the deficits listed in the problem list box on initial evaluation, provide pt/family education and to maximize pt's level of independence in the home and community environment.     Pt's spiritual, cultural and educational needs considered and pt agreeable to plan of care and goals.     Anticipated barriers to physical therapy: none    Goals:   STG (6 Weeks)  Pt will be independent with Initial home exercise program in order to facilitate Physical Therapy treatment  Pt will reduce worst pain to 4/10 in order to perform ADLs  Pt will improve hip flexion and IR by 5' in order to perform recreational tasks  Pt will improve hip External rotation and flexion strength to >80% contralateral LE in order to perform recreational tasks      LTG(12weeks)  Pt will be independent c final home exercise program in order to DC to home program  Pt will improve FOTO limitation to 76% indicative of overall improvement in function   Pt will improve worst pain to 1/10 in order to return to previous level of function   Pt will improve hip External rotation and flexion strength to >90% contralateral LE in order to perform recreational tasks     Plan     Certification Period: 8/30/2024 to 11/30/2024  Recommended Treatment Plan: 1 times per week for 12 weeks: Manual Therapy, Neuromuscular Re-ed, Patient Education, Therapeutic Activities, and Therapeutic Exercise  Other Recommendations: modalities prn, ASTYM prn, Dry  Needling prn    Therapist: Indio Quarles, PT, DPT    I CERTIFY THE NEED FOR THESE SERVICES FURNISHED UNDER THIS PLAN OF TREATMENT AND WHILE UNDER  CARE    Physician's comments: ________________________________________________________________________________________________________________________________________________      Physician's Name: ___________________________________

## 2024-10-07 ENCOUNTER — CLINICAL SUPPORT (OUTPATIENT)
Dept: REHABILITATION | Facility: OTHER | Age: 19
End: 2024-10-07
Payer: COMMERCIAL

## 2024-10-07 DIAGNOSIS — M25.60 DECREASED RANGE OF MOTION: ICD-10-CM

## 2024-10-07 DIAGNOSIS — R53.1 DECREASED STRENGTH: ICD-10-CM

## 2024-10-07 DIAGNOSIS — R26.9 ABNORMAL GAIT: Primary | ICD-10-CM

## 2024-10-07 PROCEDURE — 97530 THERAPEUTIC ACTIVITIES: CPT | Mod: PN

## 2024-10-07 PROCEDURE — 97112 NEUROMUSCULAR REEDUCATION: CPT | Mod: PN

## 2024-10-07 PROCEDURE — 97140 MANUAL THERAPY 1/> REGIONS: CPT | Mod: PN

## 2024-10-07 NOTE — PROGRESS NOTES
[facility]   Physical Therapy Treatment Note      Name: Americo Ulloa  Clinic Number: [id number]    Therapy Diagnosis:   Encounter Diagnoses   Name Primary?    Abnormal gait Yes    Decreased range of motion     Decreased strength        Physician: Wes Sullivan DO    Visit Date: 10/7/2024    Physician Orders: PT Eval and Treat   Medical Diagnosis from Referral: M25.551 (ICD-10-CM) - Pain of right hip   Evaluation Date: 6/5/2024  Authorization Period Expiration: 5/6/2024  Plan of Care Expiration: 8/30/2024  Progress Note Due: 8/5/2024  Visit # / Visits authorized: 12/ 22 + eval   FOTO: 6/5/2024     Time In: 1:32  Time Out: 2:30  Total Appointment Time (timed & untimed codes): 58 minutes     Precautions: Standard     Subjective     Pt reports: He is doing okay but still having some issues with painful techique.   He was compliant with home exercise program.  Response to previous treatment: less pain   Functional change: returning to training    Pain: 1/10  Location: right hip      Objective      Objective Measures updated at progress report unless specified.     8/2/2024:  Y Balance Ant:   R = 50cm   L = 56cm    Post Lat:  Right: 101  Left:105    Post Med:  Right: 101  Left: 112    Right ROM  IR 30   ER 45  Extension 5: 15 after session    Dynamometer 8/19/2024:  - R ER: 23.7, 23.1  - L ER: 36.6, 37.9  - R extension: 52.6, 67, 84  - L extension: 98, 72, 89  - R abduction: 41.9  - L abduction: 48.4    YBT after exercises 8/19/2024:  - R anterior: 52, 54, 52, 53, 52    YBT 9/4/2024:  - R anterior: 54  - L anterior: 62  - R posteromedial: 110  - L posteromedial: 112  - R posterolateral: 116  - L posterolateral: 116    9/30  Prone  - R ER: 36.3  - L ER: 42.4  Hip extension:  - R = 5  - L = 10      Treatment     AMERICO received the treatments listed below:      Bold = Performed    AMERICO received the following manual therapy techniques: Joint mobilizations were applied to the: Right hip  for 8 minutes, including:    True Hip distraction grade 4-5 mobilization   Hip distraction with IR mobilization mobilization grade 3  Anterior hip mobilization grade 4    Not today:  LAD grade 4-5 mobilization  Mobilization with movement into hip flexion      YAA participated in neuromuscular re-education activities to improve: Coordination, Kinesthetic, Sense, Proprioception, and Posture for 29 minutes. The following activities were included:    Prone hip extension off table 3x12 right  Power band rock backs 20x RTB  Power band hip extension 20x RTB  Cable rotations 3x12 hip extension 13#  Prone Hip ER isometric [id number] - hip extended       Not today:    Manual ER resistance 3x5  Standing Y strap hip Ero IR 3x10 17#  Side plank with hip abduction 3x10 Holds  Prone hip extension 10x3s   Lateral step down 3x5 10in  Prone ER IR 2x10  90/90 hip stretch 10x each  Modified side plank clamshell 2x10   Seated hip IR/ER hip mobilization x10    YAA participated in dynamic functional therapeutic activities to improve functional performance for 21 minutes, including:      Dynamic warm up  bike 5 min  10 yards x 2  - knee to chest 2x10  - heel to butt  - sweeps  - lateral lunge  - Lateral walks  - monster walks  Lateral jumps off of right leg 3x6        Not today  Shuttle hip extension 3x10 4 cords  SL Sit to stand 3x10 18in 10# medicine ball  Reverse lunge 2x10 60# explosive movement to single leg stance  Lateral Lunges 3x8 60# eccentric control to explosive return     Patient Education and Home Exercises       Education provided:   - Pt educated on performing Ant Pelvic tilt if attempting squatting.     Written Home Exercises Provided: Patient instructed to cont prior HEP. Exercises were reviewed and YAA was able to demonstrate them prior to the end of the session.  YAA demonstrated good  understanding of the education provided. See EMR under Patient Instructions for exercises provided during therapy  sessions    Assessment   Americo was progressed with glute activation to increase his hip strength and extension range of motion. He tolerated the session well with decreased pain. He did have issues with jumping off of that leg due to weakness and not pain. He needs to increase his hip extension and glute strength.   Plan to progress as tolerated.       AMERICO Is progressing well towards his goals.   Pt prognosis is Good.     Pt will continue to benefit from skilled outpatient physical therapy to address the deficits listed in the problem list box on initial evaluation, provide pt/family education and to maximize pt's level of independence in the home and community environment.     Pt's spiritual, cultural and educational needs considered and pt agreeable to plan of care and goals.     Anticipated barriers to physical therapy: none    Goals:   STG (6 Weeks)  Pt will be independent with Initial home exercise program in order to facilitate Physical Therapy treatment  Pt will reduce worst pain to 4/10 in order to perform ADLs  Pt will improve hip flexion and IR by 5' in order to perform recreational tasks  Pt will improve hip External rotation and flexion strength to >80% contralateral LE in order to perform recreational tasks      LTG(12weeks)  Pt will be independent c final home exercise program in order to DC to home program  Pt will improve FOTO limitation to 76% indicative of overall improvement in function   Pt will improve worst pain to 1/10 in order to return to previous level of function   Pt will improve hip External rotation and flexion strength to >90% contralateral LE in order to perform recreational tasks     Plan     Certification Period: 8/30/2024 to 11/30/2024      Continue with PT plan of care with emphasis on glute strength and hip extension ROM.       Therapist: Indio Quarles, PT, DPT

## 2024-10-21 ENCOUNTER — CLINICAL SUPPORT (OUTPATIENT)
Dept: REHABILITATION | Facility: OTHER | Age: 19
End: 2024-10-21
Payer: COMMERCIAL

## 2024-10-21 DIAGNOSIS — M25.60 DECREASED RANGE OF MOTION: ICD-10-CM

## 2024-10-21 DIAGNOSIS — R53.1 DECREASED STRENGTH: ICD-10-CM

## 2024-10-21 DIAGNOSIS — R26.9 ABNORMAL GAIT: Primary | ICD-10-CM

## 2024-10-21 PROCEDURE — 97530 THERAPEUTIC ACTIVITIES: CPT | Mod: PN

## 2024-10-21 PROCEDURE — 97140 MANUAL THERAPY 1/> REGIONS: CPT | Mod: PN

## 2024-10-21 PROCEDURE — 97112 NEUROMUSCULAR REEDUCATION: CPT | Mod: PN

## 2024-10-28 ENCOUNTER — CLINICAL SUPPORT (OUTPATIENT)
Dept: REHABILITATION | Facility: OTHER | Age: 19
End: 2024-10-28
Payer: COMMERCIAL

## 2024-10-28 DIAGNOSIS — M25.60 DECREASED RANGE OF MOTION: ICD-10-CM

## 2024-10-28 DIAGNOSIS — R26.9 ABNORMAL GAIT: Primary | ICD-10-CM

## 2024-10-28 DIAGNOSIS — R53.1 DECREASED STRENGTH: ICD-10-CM

## 2024-10-28 PROCEDURE — 97112 NEUROMUSCULAR REEDUCATION: CPT | Mod: PN

## 2024-10-28 PROCEDURE — 97530 THERAPEUTIC ACTIVITIES: CPT | Mod: PN

## 2024-10-28 PROCEDURE — 97110 THERAPEUTIC EXERCISES: CPT | Mod: PN

## 2024-11-04 ENCOUNTER — CLINICAL SUPPORT (OUTPATIENT)
Dept: REHABILITATION | Facility: OTHER | Age: 19
End: 2024-11-04
Payer: COMMERCIAL

## 2024-11-04 DIAGNOSIS — R53.1 DECREASED STRENGTH: ICD-10-CM

## 2024-11-04 DIAGNOSIS — R26.9 ABNORMAL GAIT: Primary | ICD-10-CM

## 2024-11-04 DIAGNOSIS — M25.60 DECREASED RANGE OF MOTION: ICD-10-CM

## 2024-11-04 PROCEDURE — 97112 NEUROMUSCULAR REEDUCATION: CPT | Mod: PN

## 2024-11-04 PROCEDURE — 97530 THERAPEUTIC ACTIVITIES: CPT | Mod: PN

## 2024-11-04 PROCEDURE — 97140 MANUAL THERAPY 1/> REGIONS: CPT | Mod: PN

## 2024-11-04 NOTE — PROGRESS NOTES
[facility]   Physical Therapy Treatment Note      Name: Americo Ulloa  [facility] Number: [id number]    Therapy Diagnosis:   Encounter Diagnoses   Name Primary?    Abnormal gait Yes    Decreased range of motion     Decreased strength            Physician: Wes Sullivan DO    Visit Date: 11/4/2024    Physician Orders: PT Eval and Treat   Medical Diagnosis from Referral: M25.551 (ICD-10-CM) - Pain of right hip   Evaluation Date: 6/5/2024  Authorization Period Expiration: 5/6/2024  Plan of Care Expiration: 8/30/2024  Progress Note Due: 8/5/2024  Visit # / Visits authorized: 16/ 22 + eval   FOTO: 6/5/2024     Time In: 1:32  Time Out: 2:32  Total Appointment Time (timed & untimed codes): 60 minutes     Precautions: Standard     Subjective     Pt reports: His hip is feeling a lot better this week  He was compliant with home exercise program.  Response to previous treatment: less pain   Functional change: returning to training    Pain: 1/10  Location: right hip      Objective      Objective Measures updated at progress report unless specified.     8/2/2024:  Y Balance Ant:   R = 50cm   L = 56cm    Post Lat:  Right: 101  Left:105    Post Med:  Right: 101  Left: 112    Right ROM  IR 30   ER 45  Extension 5: 15 after session    Dynamometer 8/19/2024:  - R ER: 23.7, 23.1  - L ER: 36.6, 37.9  - R extension: 52.6, 67, 84  - L extension: 98, 72, 89  - R abduction: 41.9  - L abduction: 48.4    YBT after exercises 8/19/2024:  - R anterior: 52, 54, 52, 53, 52    YBT 9/4/2024:  - R anterior: 54  - L anterior: 62  - R posteromedial: 110  - L posteromedial: 112  - R posterolateral: 116  - L posterolateral: 116    9/30  Prone  - R ER: 36.3  - L ER: 42.4  Hip extension:  - R = 5  - L = 10      Treatment     AMERICO received the treatments listed below:      Therapeutic exercises to develop strength, endurance, ROM, flexibility, and posture for 0 minutes including:  Dynamic warm up    10 yards x 2  - knee to  chest 2x10  - heel to butt  - sweeps  - lateral lunge  - Lateral walks  - monster walks    YAA received the following manual therapy techniques: Joint mobilizations were applied to the: Right hip for 8 minutes, including:    True Hip distraction grade 4-5 mobilization   Hip distraction with IR mobilization mobilization grade 3  Anterior hip mobilization grade 4    Not today:  LAD grade 4-5 mobilization  Mobilization with movement into hip flexion     YAA participated in neuromuscular re-education activities to improve: Coordination, Kinesthetic, Sense, Proprioception, and Posture for 19 minutes. The following activities were included:    Power band rock backs 20x RTB  Power band hip extension 20x RTB  Bike 5 min  Side lying hip ER with hold 10x5s each way  Plank with hip extension 3x10 each    Not today:  Cable rotations 3x12 hip extension 13#  Prone Hip ER isometric [id number] - hip extended   Manual ER resistance 3x5  Standing Y strap hip Ero IR 3x10 17#  Side plank with hip abduction 3x10 Holds  Prone hip extension 10x3s   Lateral step down 3x5 10in  Prone ER IR 2x10  90/90 hip stretch 10x each  Modified side plank clamshell 2x10   Seated hip IR/ER hip mobilization x10    YAA participated in dynamic functional therapeutic activities to improve functional performance for 33 minutes, including:    Air planes 3x6 10#  10# hip flexion locke test position   Lateral med ball throws 3x5 8#  Sled Push 4x5 steps each 70#  RFESS 18in box each 2x10    Not today  Cable body rotation 3x6 20#  SL hip thrust 3x10   Hip flexion eccentric 3x8  Lateral jumps off of right leg 3x6  Shuttle hip extension 3x10 4 cords  SL Sit to stand 3x10 18in 10# medicine ball  Reverse lunge 2x10 60# explosive movement to single leg stance  Lateral Lunges 3x8 60# eccentric control to explosive return     Patient Education and Home Exercises       Education provided:   - Pt educated on performing Ant Pelvic tilt if attempting squatting.      Written Home Exercises Provided: Patient instructed to cont prior HEP. Exercises were reviewed and AMERICO was able to demonstrate them prior to the end of the session.  AMERICO demonstrated good  understanding of the education provided. See EMR under Patient Instructions for exercises provided during therapy sessions    Assessment   Americo was further progressed with hip intrinsic strengthening exercises. He tolerated the session well without increase in symptoms. His hip was very fatigued at the end. He required cues to not hip abduction with exercises.   Plan to progress as tolerated.       AMERICO Is progressing well towards his goals.   Pt prognosis is Good.     Pt will continue to benefit from skilled outpatient physical therapy to address the deficits listed in the problem list box on initial evaluation, provide pt/family education and to maximize pt's level of independence in the home and community environment.     Pt's spiritual, cultural and educational needs considered and pt agreeable to plan of care and goals.     Anticipated barriers to physical therapy: none    Goals:   STG (6 Weeks)  Pt will be independent with Initial home exercise program in order to facilitate Physical Therapy treatment  Pt will reduce worst pain to 4/10 in order to perform ADLs  Pt will improve hip flexion and IR by 5' in order to perform recreational tasks  Pt will improve hip External rotation and flexion strength to >80% contralateral LE in order to perform recreational tasks      LTG(12weeks)  Pt will be independent c final home exercise program in order to DC to home program  Pt will improve FOTO limitation to 76% indicative of overall improvement in function   Pt will improve worst pain to 1/10 in order to return to previous level of function   Pt will improve hip External rotation and flexion strength to >90% contralateral LE in order to perform recreational tasks     Plan     Certification Period: 8/30/2024 to  11/30/2024      Continue with PT plan of care with emphasis on glute strength and hip extension ROM.       Therapist: Indio Quarles, PT, DPT

## 2024-11-11 ENCOUNTER — CLINICAL SUPPORT (OUTPATIENT)
Dept: REHABILITATION | Facility: OTHER | Age: 19
End: 2024-11-11
Payer: COMMERCIAL

## 2024-11-11 DIAGNOSIS — R53.1 DECREASED STRENGTH: ICD-10-CM

## 2024-11-11 DIAGNOSIS — R26.9 ABNORMAL GAIT: Primary | ICD-10-CM

## 2024-11-11 DIAGNOSIS — M25.60 DECREASED RANGE OF MOTION: ICD-10-CM

## 2024-11-11 PROCEDURE — 97530 THERAPEUTIC ACTIVITIES: CPT | Mod: PN

## 2024-11-11 PROCEDURE — 97112 NEUROMUSCULAR REEDUCATION: CPT | Mod: PN

## 2024-11-11 PROCEDURE — 97110 THERAPEUTIC EXERCISES: CPT | Mod: PN

## 2024-11-11 NOTE — PROGRESS NOTES
[facility]   Physical Therapy Treatment Note      Name: Americo Ulloa  [facility] Number: [id number]    Therapy Diagnosis:   Encounter Diagnoses   Name Primary?    Abnormal gait Yes    Decreased range of motion     Decreased strength            Physician: Wes Sullivan DO    Visit Date: 11/11/2024    Physician Orders: PT Eval and Treat   Medical Diagnosis from Referral: M25.551 (ICD-10-CM) - Pain of right hip   Evaluation Date: 6/5/2024  Authorization Period Expiration: 5/6/2024  Plan of Care Expiration: 8/30/2024  Progress Note Due: 8/5/2024  Visit # / Visits authorized: 16/ 22 + eval   FOTO: 6/5/2024     Time In: 1:32  Time Out: 2:35  Total Appointment Time (timed & untimed codes): 63 minutes     Precautions: Standard     Subjective     Pt reports: He was able to throw some this weekend without much issue  He was compliant with home exercise program.  Response to previous treatment: less pain   Functional change: returning to training    Pain: 1/10  Location: right hip      Objective      Objective Measures updated at progress report unless specified.     8/2/2024:  Y Balance Ant:   R = 50cm   L = 56cm    Post Lat:  Right: 101  Left:105    Post Med:  Right: 101  Left: 112    Right ROM  IR 30   ER 45  Extension 5: 15 after session    Dynamometer 8/19/2024:  - R ER: 23.7, 23.1  - L ER: 36.6, 37.9  - R extension: 52.6, 67, 84  - L extension: 98, 72, 89  - R abduction: 41.9  - L abduction: 48.4    YBT after exercises 8/19/2024:  - R anterior: 52, 54, 52, 53, 52    YBT 9/4/2024:  - R anterior: 54  - L anterior: 62  - R posteromedial: 110  - L posteromedial: 112  - R posterolateral: 116  - L posterolateral: 116    9/30  Prone  - R ER: 36.3  - L ER: 42.4  Hip extension:  - R = 5  - L = 10      Treatment     AEMRICO received the treatments listed below:      Therapeutic exercises to develop strength, endurance, ROM, flexibility, and posture for 12 minutes including:  Dynamic warm up    10  yards x 2  - knee to chest 2x10  - heel to butt  - sweeps  - lateral lunge  - Lateral walks  - monster walks    YAA received the following manual therapy techniques: Joint mobilizations were applied to the: Right hip for 0 minutes, including:    True Hip distraction grade 4-5 mobilization   Hip distraction with IR mobilization mobilization grade 3  Anterior hip mobilization grade 4    Not today:  LAD grade 4-5 mobilization  Mobilization with movement into hip flexion     YAA participated in neuromuscular re-education activities to improve: Coordination, Kinesthetic, Sense, Proprioception, and Posture for 19 minutes. The following activities were included:    Cable rotations 3x12 hip extension 13#  Bike 5 min  Side lying hip ER with hold 10x5s each way  Plank with hip extension 3x10 each        Not today:  Power band rock backs 20x RTB  Power band hip extension 20x RTB  Prone Hip ER isometric [id number] - hip extended   Manual ER resistance 3x5  Standing Y strap hip Ero IR 3x10 17#  Side plank with hip abduction 3x10 Holds  Prone hip extension 10x3s   Lateral step down 3x5 10in  Prone ER IR 2x10  90/90 hip stretch 10x each  Modified side plank clamshell 2x10   Seated hip IR/ER hip mobilization x10    YAA participated in dynamic functional therapeutic activities to improve functional performance for 32 minutes, including:    Air planes 3x6 10#  10# hip flexion locke test position 3x8  Lateral med ball throws 3x5 12#  SL hip thrust 18in box 3x8 each  Hip flexion Cable 3x10 17#        Not today  RFESS 18in box each 2x10  Sled Push 4x5 steps each 70#Cable body rotation 3x6 20#  SL hip thrust 3x10   Hip flexion eccentric 3x8  Lateral jumps off of right leg 3x6  Shuttle hip extension 3x10 4 cords  SL Sit to stand 3x10 18in 10# medicine ball  Reverse lunge 2x10 60# explosive movement to single leg stance  Lateral Lunges 3x8 60# eccentric control to explosive return     Patient Education and Home Exercises        Education provided:   - Pt educated on performing Ant Pelvic tilt if attempting squatting.     Written Home Exercises Provided: Patient instructed to cont prior HEP. Exercises were reviewed and AMERICO was able to demonstrate them prior to the end of the session.  AMERICO demonstrated good  understanding of the education provided. See EMR under Patient Instructions for exercises provided during therapy sessions    Assessment   Americo was better able to perform hip flexion strengthening exercises in the end range. He is making progress and did not have symptoms during the session. Overall he is doing well. Will continues to monitor response to exercise as his activity level increases.   Plan to progress as tolerated.       AMERICO Is progressing well towards his goals.   Pt prognosis is Good.     Pt will continue to benefit from skilled outpatient physical therapy to address the deficits listed in the problem list box on initial evaluation, provide pt/family education and to maximize pt's level of independence in the home and community environment.     Pt's spiritual, cultural and educational needs considered and pt agreeable to plan of care and goals.     Anticipated barriers to physical therapy: none    Goals:   STG (6 Weeks)  Pt will be independent with Initial home exercise program in order to facilitate Physical Therapy treatment  Pt will reduce worst pain to 4/10 in order to perform ADLs  Pt will improve hip flexion and IR by 5' in order to perform recreational tasks  Pt will improve hip External rotation and flexion strength to >80% contralateral LE in order to perform recreational tasks      LTG(12weeks)  Pt will be independent c final home exercise program in order to DC to home program  Pt will improve FOTO limitation to 76% indicative of overall improvement in function   Pt will improve worst pain to 1/10 in order to return to previous level of function   Pt will improve hip External rotation and  flexion strength to >90% contralateral LE in order to perform recreational tasks     Plan     Certification Period: 8/30/2024 to 11/30/2024      Continue with PT plan of care with emphasis on glute strength and hip extension ROM.       Therapist: Indio Quarles, PT, DPT

## 2024-11-18 ENCOUNTER — CLINICAL SUPPORT (OUTPATIENT)
Dept: REHABILITATION | Facility: OTHER | Age: 19
End: 2024-11-18
Payer: COMMERCIAL

## 2024-11-18 DIAGNOSIS — M25.60 DECREASED RANGE OF MOTION: ICD-10-CM

## 2024-11-18 DIAGNOSIS — R53.1 DECREASED STRENGTH: ICD-10-CM

## 2024-11-18 DIAGNOSIS — R26.9 ABNORMAL GAIT: Primary | ICD-10-CM

## 2024-11-18 PROCEDURE — 97530 THERAPEUTIC ACTIVITIES: CPT | Mod: PN

## 2024-11-18 PROCEDURE — 97112 NEUROMUSCULAR REEDUCATION: CPT | Mod: PN

## 2024-11-18 PROCEDURE — 97110 THERAPEUTIC EXERCISES: CPT | Mod: PN

## 2024-11-18 NOTE — PROGRESS NOTES
[facility]   Physical Therapy Treatment Note      Name: Americo Ulloa  [facility] Number: [id number]    Therapy Diagnosis:   Encounter Diagnoses   Name Primary?    Abnormal gait Yes    Decreased range of motion     Decreased strength        Physician: Wes Sullivan DO    Visit Date: 11/18/2024    Physician Orders: PT Eval and Treat   Medical Diagnosis from Referral: M25.551 (ICD-10-CM) - Pain of right hip   Evaluation Date: 6/5/2024  Authorization Period Expiration: 5/6/2024  Plan of Care Expiration: 8/30/2024  Progress Note Due: 8/5/2024  Visit # / Visits authorized: 18/ 22 + eval   FOTO: 6/5/2024     Time In: 1:32  Time Out: 2:30  Total Appointment Time (timed & untimed codes): 58 minutes     Precautions: Standard     Subjective     Pt reports: He was able to throw some this weekend without much issue, feeling good overall  He was compliant with home exercise program.  Response to previous treatment: less pain   Functional change: returning to training    Pain: 1/10  Location: right hip      Objective      Objective Measures updated at progress report unless specified.     8/2/2024:  Y Balance Ant:   R = 50cm   L = 56cm    Post Lat:  Right: 101  Left:105    Post Med:  Right: 101  Left: 112    Right ROM  IR 30   ER 45  Extension 5: 15 after session    Dynamometer 8/19/2024:  - R ER: 23.7, 23.1  - L ER: 36.6, 37.9  - R extension: 52.6, 67, 84  - L extension: 98, 72, 89  - R abduction: 41.9  - L abduction: 48.4    YBT after exercises 8/19/2024:  - R anterior: 52, 54, 52, 53, 52    YBT 9/4/2024:  - R anterior: 54  - L anterior: 62  - R posteromedial: 110  - L posteromedial: 112  - R posterolateral: 116  - L posterolateral: 116    9/30  Prone  - R ER: 36.3  - L ER: 42.4  Hip extension:  - R = 5  - L = 10      Treatment     AMERICO received the treatments listed below:      Therapeutic exercises to develop strength, endurance, ROM, flexibility, and posture for 10 minutes including:  Dynamic  warm up    10 yards x 2  - knee to chest 2x10  - heel to butt  - sweeps  - lateral lunge  - Lateral walks  - monster walks    YAA received the following manual therapy techniques: Joint mobilizations were applied to the: Right hip for 0 minutes, including:    True Hip distraction grade 4-5 mobilization   Hip distraction with IR mobilization mobilization grade 3  Anterior hip mobilization grade 4    Not today:  LAD grade 4-5 mobilization  Mobilization with movement into hip flexion     YAA participated in neuromuscular re-education activities to improve: Coordination, Kinesthetic, Sense, Proprioception, and Posture for 27 minutes. The following activities were included:    Cable rotations 3x12 hip extension 13#  Bike 5 min  Side lying hip ER with hold 10x5s each way  Plank with hip extension 3x10 each  Power band rock backs 20x RTB  Power band hip extension 20x RTB  Standing Y strap hip ER 3x10 17#    Not today:    Prone Hip ER isometric [id number] - hip extended   Manual ER resistance 3x5    Side plank with hip abduction 3x10 Holds  Prone hip extension 10x3s   Lateral step down 3x5 10in  Prone ER IR 2x10  90/90 hip stretch 10x each  Modified side plank clamshell 2x10   Seated hip IR/ER hip mobilization x10    YAA participated in dynamic functional therapeutic activities to improve functional performance for 21 minutes, including:    Air planes 3x6 10#  10# hip flexion locke test position 3x8  SL hip thrust 18in box 3x8 each  Hip flexion Cable 3x10 17#        Not today  RFESS 18in box each 2x10  Sled Push 4x5 steps each 70#Cable body rotation 3x6 20#  SL hip thrust 3x10   Hip flexion eccentric 3x8  Lateral jumps off of right leg 3x6  Shuttle hip extension 3x10 4 cords  SL Sit to stand 3x10 18in 10# medicine ball  Reverse lunge 2x10 60# explosive movement to single leg stance  Lateral Lunges 3x8 60# eccentric control to explosive return     Patient Education and Home Exercises       Education provided:   -  Pt educated on performing Ant Pelvic tilt if attempting squatting.     Written Home Exercises Provided: Patient instructed to cont prior HEP. Exercises were reviewed and AMERICO was able to demonstrate them prior to the end of the session.  AMERICO demonstrated good  understanding of the education provided. See EMR under Patient Instructions for exercises provided during therapy sessions    Assessment   Americo continues to be challenged with hip strengthening exercises. He is making good progress overall. He has been able to throw some without much discomfort.   Plan to progress as tolerated.       AMERICO Is progressing well towards his goals.   Pt prognosis is Good.     Pt will continue to benefit from skilled outpatient physical therapy to address the deficits listed in the problem list box on initial evaluation, provide pt/family education and to maximize pt's level of independence in the home and community environment.     Pt's spiritual, cultural and educational needs considered and pt agreeable to plan of care and goals.     Anticipated barriers to physical therapy: none    Goals:   STG (6 Weeks)  Pt will be independent with Initial home exercise program in order to facilitate Physical Therapy treatment  Pt will reduce worst pain to 4/10 in order to perform ADLs  Pt will improve hip flexion and IR by 5' in order to perform recreational tasks  Pt will improve hip External rotation and flexion strength to >80% contralateral LE in order to perform recreational tasks      LTG(12weeks)  Pt will be independent c final home exercise program in order to DC to home program  Pt will improve FOTO limitation to 76% indicative of overall improvement in function   Pt will improve worst pain to 1/10 in order to return to previous level of function   Pt will improve hip External rotation and flexion strength to >90% contralateral LE in order to perform recreational tasks     Plan     Certification Period: 8/30/2024 to  11/30/2024      Continue with PT plan of care with emphasis on glute strength and hip extension ROM.       Therapist: Indio Quarles, PT, DPT

## 2024-12-02 ENCOUNTER — CLINICAL SUPPORT (OUTPATIENT)
Dept: REHABILITATION | Facility: OTHER | Age: 19
End: 2024-12-02
Payer: COMMERCIAL

## 2024-12-02 DIAGNOSIS — M25.60 DECREASED RANGE OF MOTION: ICD-10-CM

## 2024-12-02 DIAGNOSIS — R26.9 ABNORMAL GAIT: Primary | ICD-10-CM

## 2024-12-02 DIAGNOSIS — R53.1 DECREASED STRENGTH: ICD-10-CM

## 2024-12-02 PROCEDURE — 97112 NEUROMUSCULAR REEDUCATION: CPT | Mod: PN

## 2024-12-02 PROCEDURE — 97530 THERAPEUTIC ACTIVITIES: CPT | Mod: PN

## 2024-12-02 PROCEDURE — 97110 THERAPEUTIC EXERCISES: CPT | Mod: PN

## 2024-12-05 NOTE — PROGRESS NOTES
[facility]   Physical Therapy Treatment Note      Name: Americo Ulloa  [facility] Number: [id number]    Therapy Diagnosis:   Encounter Diagnoses   Name Primary?    Abnormal gait Yes    Decreased range of motion     Decreased strength        Physician: Wes Sullivan DO    Visit Date: 12/2/2024    Physician Orders: PT Eval and Treat   Medical Diagnosis from Referral: M25.551 (ICD-10-CM) - Pain of right hip   Evaluation Date: 6/5/2024  Authorization Period Expiration: 5/6/2024  Plan of Care Expiration: 8/30/2024  Progress Note Due: 8/5/2024  Visit # / Visits authorized: 18/ 22 + eval   FOTO: 6/5/2024     Time In: 1:35  Time Out: 2:30  Total Appointment Time (timed & untimed codes): 55 minutes     Precautions: Standard     Subjective     Pt reports: He is hip feels stable and not getting worse. He feels he able to compete but will see more after his meet on Friday.  He was compliant with home exercise program.  Response to previous treatment: less pain   Functional change: returning to training    Pain: 1/10  Location: right hip      Objective      Objective Measures updated at progress report unless specified.     8/2/2024:  Y Balance Ant:   R = 50cm   L = 56cm    Post Lat:  Right: 101  Left:105    Post Med:  Right: 101  Left: 112    Right ROM  IR 30   ER 45  Extension 5: 15 after session    Dynamometer 8/19/2024:  - R ER: 23.7, 23.1  - L ER: 36.6, 37.9  - R extension: 52.6, 67, 84  - L extension: 98, 72, 89  - R abduction: 41.9  - L abduction: 48.4    YBT after exercises 8/19/2024:  - R anterior: 52, 54, 52, 53, 52    YBT 9/4/2024:  - R anterior: 54  - L anterior: 62  - R posteromedial: 110  - L posteromedial: 112  - R posterolateral: 116  - L posterolateral: 116    9/30  Prone  - R ER: 36.3  - L ER: 42.4  Hip extension:  - R = 5  - L = 10      Treatment     AMERICO received the treatments listed below:      Therapeutic exercises to develop strength, endurance, ROM, flexibility, and  posture for 13 minutes including:  Bike 5 min  Dynamic warm up  10 yards x 2  - knee to chest 2x10  - heel to butt  - sweeps  - lateral lunge  - Lateral walks  - monster walks    YAA received the following manual therapy techniques: Joint mobilizations were applied to the: Right hip for 0 minutes, including:    True Hip distraction grade 4-5 mobilization   Hip distraction with IR mobilization mobilization grade 3  Anterior hip mobilization grade 4    Not today:  LAD grade 4-5 mobilization  Mobilization with movement into hip flexion     YAA participated in neuromuscular re-education activities to improve: Coordination, Kinesthetic, Sense, Proprioception, and Posture for 26 minutes. The following activities were included:      Cable rotations 3x12 hip extension isometric 10x5s holds x 2  Prone hip ER isometric 10x5s holds x 2  Side lying hip ER with hold 10x5s each way  Plank with hip extension 3x10 each  Power band rock backs 20x RTB  Power band hip extension 20x RTB  Side plank with hip abduction 3x10 Holds    Not today:  Standing Y strap hip ER 3x10 17#  Prone Hip ER isometric [id number] - hip extended   Manual ER resistance 3x5      Prone hip extension 10x3s   Lateral step down 3x5 10in  Prone ER IR 2x10  90/90 hip stretch 10x each  Modified side plank clamshell 2x10   Seated hip IR/ER hip mobilization x10    YAA participated in dynamic functional therapeutic activities to improve functional performance for 16 minutes, including:    Air planes 3x6 10#  10# hip flexion locke test position 3x8  SL hip thrust 18in box 3x8 each  Hip flexion Cable 3x10 17#          Not today  RFESS 18in box each 2x10  Sled Push 4x5 steps each 70#Cable body rotation 3x6 20#  SL hip thrust 3x10   Hip flexion eccentric 3x8  Lateral jumps off of right leg 3x6  Shuttle hip extension 3x10 4 cords  SL Sit to stand 3x10 18in 10# medicine ball  Reverse lunge 2x10 60# explosive movement to single leg stance  Lateral Lunges 3x8 60#  eccentric control to explosive return     Patient Education and Home Exercises       Education provided:   - Pt educated on performing Ant Pelvic tilt if attempting squatting.     Written Home Exercises Provided: Patient instructed to cont prior HEP. Exercises were reviewed and AMERICO was able to demonstrate them prior to the end of the session.  AMERICO demonstrated good  understanding of the education provided. See EMR under Patient Instructions for exercises provided during therapy sessions    Assessment   Americo was challenged with trunk rotation isometric. He did not have pain during the session, just hip fatigue. He will have a meet this week on Friday and will see how he performs/ feels.   Plan to progress as tolerated.       AMERICO Is progressing well towards his goals.   Pt prognosis is Good.     Pt will continue to benefit from skilled outpatient physical therapy to address the deficits listed in the problem list box on initial evaluation, provide pt/family education and to maximize pt's level of independence in the home and community environment.     Pt's spiritual, cultural and educational needs considered and pt agreeable to plan of care and goals.     Anticipated barriers to physical therapy: none    Goals:   STG (6 Weeks)  Pt will be independent with Initial home exercise program in order to facilitate Physical Therapy treatment  Pt will reduce worst pain to 4/10 in order to perform ADLs  Pt will improve hip flexion and IR by 5' in order to perform recreational tasks  Pt will improve hip External rotation and flexion strength to >80% contralateral LE in order to perform recreational tasks      LTG(12weeks)  Pt will be independent c final home exercise program in order to DC to home program  Pt will improve FOTO limitation to 76% indicative of overall improvement in function   Pt will improve worst pain to 1/10 in order to return to previous level of function   Pt will improve hip External rotation  and flexion strength to >90% contralateral LE in order to perform recreational tasks     Plan     Certification Period: 8/30/2024 to 11/30/2024      Continue with PT plan of care with emphasis on glute strength and hip extension ROM.       Therapist: Indio Quarles, PT, DPT

## 2024-12-09 ENCOUNTER — CLINICAL SUPPORT (OUTPATIENT)
Dept: REHABILITATION | Facility: OTHER | Age: 19
End: 2024-12-09
Payer: COMMERCIAL

## 2024-12-09 DIAGNOSIS — R26.9 ABNORMAL GAIT: Primary | ICD-10-CM

## 2024-12-09 DIAGNOSIS — M25.60 DECREASED RANGE OF MOTION: ICD-10-CM

## 2024-12-09 DIAGNOSIS — R53.1 DECREASED STRENGTH: ICD-10-CM

## 2024-12-09 PROCEDURE — 97110 THERAPEUTIC EXERCISES: CPT | Mod: PN

## 2024-12-09 PROCEDURE — 97530 THERAPEUTIC ACTIVITIES: CPT | Mod: PN

## 2024-12-09 PROCEDURE — 97112 NEUROMUSCULAR REEDUCATION: CPT | Mod: PN

## 2024-12-19 NOTE — PROGRESS NOTES
[facility]   Physical Therapy Treatment Note      Name: Americo Ulloa  [facility] Number: [id number]    Therapy Diagnosis:   Encounter Diagnoses   Name Primary?    Abnormal gait Yes    Decreased range of motion     Decreased strength        Physician: Wes Sullivan DO    Visit Date: 12/9/2024    Physician Orders: PT Eval and Treat   Medical Diagnosis from Referral: M25.551 (ICD-10-CM) - Pain of right hip   Evaluation Date: 6/5/2024  Authorization Period Expiration: 5/6/2024  Plan of Care Expiration: 8/30/2024  Progress Note Due: 8/5/2024  Visit # / Visits authorized: 18/ 22 + eval   FOTO: 6/5/2024     Time In: 1:30  Time Out: 2:30  Total Appointment Time (timed & untimed codes): 60 minutes     Precautions: Standard     Subjective     Pt reports: He went to his meet and performed well. He did have hip pain during the meet  He was compliant with home exercise program.  Response to previous treatment: less pain   Functional change: returning to training    Pain: 1/10  Location: right hip      Objective      Objective Measures updated at progress report unless specified.     8/2/2024:  Y Balance Ant:   R = 50cm   L = 56cm    Post Lat:  Right: 101  Left:105    Post Med:  Right: 101  Left: 112    Right ROM  IR 30   ER 45  Extension 5: 15 after session    Dynamometer 8/19/2024:  - R ER: 23.7, 23.1  - L ER: 36.6, 37.9  - R extension: 52.6, 67, 84  - L extension: 98, 72, 89  - R abduction: 41.9  - L abduction: 48.4    YBT after exercises 8/19/2024:  - R anterior: 52, 54, 52, 53, 52    YBT 9/4/2024:  - R anterior: 54  - L anterior: 62  - R posteromedial: 110  - L posteromedial: 112  - R posterolateral: 116  - L posterolateral: 116    9/30  Prone  - R ER: 36.3  - L ER: 42.4  Hip extension:  - R = 5  - L = 10      Treatment     AMERICO received the treatments listed below:      Therapeutic exercises to develop strength, endurance, ROM, flexibility, and posture for 13 minutes including:  Bike 5  min  Dynamic warm up  10 yards x 2  - knee to chest 2x10  - heel to butt  - sweeps  - lateral lunge  - Lateral walks  - monster walks    YAA received the following manual therapy techniques: Joint mobilizations were applied to the: Right hip for 0 minutes, including:    True Hip distraction grade 4-5 mobilization   Hip distraction with IR mobilization mobilization grade 3  Anterior hip mobilization grade 4    Not today:  LAD grade 4-5 mobilization  Mobilization with movement into hip flexion     YAA participated in neuromuscular re-education activities to improve: Coordination, Kinesthetic, Sense, Proprioception, and Posture for 26 minutes. The following activities were included:    Cable rotations 3x12 hip extension isometric 10x5s holds x 2  Prone hip ER isometric 10x5s holds x 2  Side lying hip ER with hold 10x5s each way  Plank with hip extension 3x10 each  Power band rock backs 20x RTB  Power band hip extension 20x RTB      Not today:  Side plank with hip abduction 3x10 Holds  Standing Y strap hip ER 3x10 17#  Prone Hip ER isometric [id number] - hip extended   Manual ER resistance 3x5      Prone hip extension 10x3s   Lateral step down 3x5 10in  Prone ER IR 2x10  90/90 hip stretch 10x each  Modified side plank clamshell 2x10   Seated hip IR/ER hip mobilization x10    YAA participated in dynamic functional therapeutic activities to improve functional performance for 21 minutes, including:    Air planes 3x6 10#  10# hip flexion locke test position 3x8  SL hip thrust 18in box 3x8 each  Hip flexion Cable 3x10 17#  Lunge isometrics 3x5 60# to push back   Shuttle hip extension 3x10 4 cords      Not today  RFESS 18in box each 2x10  Sled Push 4x5 steps each 70#Cable body rotation 3x6 20#  SL hip thrust 3x10   Hip flexion eccentric 3x8  Lateral jumps off of right leg 3x6  SL Sit to stand 3x10 18in 10# medicine ball  Reverse lunge 2x10 60# explosive movement to single leg stance  Lateral Lunges 3x8 60#  eccentric control to explosive return     Patient Education and Home Exercises       Education provided:   - Pt educated on performing Ant Pelvic tilt if attempting squatting.     Written Home Exercises Provided: Patient instructed to cont prior HEP. Exercises were reviewed and AMERICO was able to demonstrate them prior to the end of the session.  AMERICO demonstrated good  understanding of the education provided. See EMR under Patient Instructions for exercises provided during therapy sessions    Assessment   Americo continues to be challenged with lower extremity and core strengthening exercises. He did not have pain during the session, but his hip was very fatigued. He did well at his meet but still had pain.  Plan to progress as tolerated.       AMERICO Is progressing well towards his goals.   Pt prognosis is Good.     Pt will continue to benefit from skilled outpatient physical therapy to address the deficits listed in the problem list box on initial evaluation, provide pt/family education and to maximize pt's level of independence in the home and community environment.     Pt's spiritual, cultural and educational needs considered and pt agreeable to plan of care and goals.     Anticipated barriers to physical therapy: none    Goals:   STG (6 Weeks)  Pt will be independent with Initial home exercise program in order to facilitate Physical Therapy treatment  Pt will reduce worst pain to 4/10 in order to perform ADLs  Pt will improve hip flexion and IR by 5' in order to perform recreational tasks  Pt will improve hip External rotation and flexion strength to >80% contralateral LE in order to perform recreational tasks      LTG(12weeks)  Pt will be independent c final home exercise program in order to DC to home program  Pt will improve FOTO limitation to 76% indicative of overall improvement in function   Pt will improve worst pain to 1/10 in order to return to previous level of function   Pt will improve hip  External rotation and flexion strength to >90% contralateral LE in order to perform recreational tasks     Plan     Certification Period: 8/30/2024 to 11/30/2024      Continue with PT plan of care with emphasis on glute strength and hip extension ROM.       Therapist: Indio Quarles, PT, DPT

## 2025-01-09 PROBLEM — R29.898 DECREASED STRENGTH OF LOWER EXTREMITY: Status: ACTIVE | Noted: 2024-06-06

## 2025-01-09 PROBLEM — M25.651 IMPAIRED RANGE OF MOTION OF RIGHT HIP: Status: ACTIVE | Noted: 2024-06-06

## 2025-01-09 PROBLEM — M25.551 RIGHT HIP PAIN: Status: ACTIVE | Noted: 2025-01-09

## 2025-01-13 ENCOUNTER — CLINICAL SUPPORT (OUTPATIENT)
Dept: REHABILITATION | Facility: OTHER | Age: 20
End: 2025-01-13
Payer: COMMERCIAL

## 2025-01-13 DIAGNOSIS — M25.551 RIGHT HIP PAIN: Primary | ICD-10-CM

## 2025-01-13 DIAGNOSIS — M25.651 IMPAIRED RANGE OF MOTION OF RIGHT HIP: ICD-10-CM

## 2025-01-13 DIAGNOSIS — R26.9 ABNORMAL GAIT: ICD-10-CM

## 2025-01-13 DIAGNOSIS — R29.898 DECREASED STRENGTH OF LOWER EXTREMITY: ICD-10-CM

## 2025-01-13 PROCEDURE — 97112 NEUROMUSCULAR REEDUCATION: CPT | Mod: PN

## 2025-01-13 NOTE — PROGRESS NOTES
[facility]   Physical Therapy Treatment Note      Name: Americo Ulloa  Clinic Number: [id number]    Therapy Diagnosis:   Encounter Diagnoses   Name Primary?    Right hip pain Yes    Abnormal gait     Impaired range of motion of right hip     Decreased strength of lower extremity          Physician: Wes Sullivan DO    Visit Date: 1/13/2025    Physician Orders: PT Eval and Treat   Medical Diagnosis from Referral: M25.551 (ICD-10-CM) - Pain of right hip   Evaluation Date: 6/5/2024  Authorization Period Expiration: 5/6/2024  Plan of Care Expiration: 8/30/2024  Progress Note Due: 8/5/2024  Visit # / Visits authorized: 1/ 20   FOTO: 6/5/2024     Time In: 11:05  Time Out: 12:00  Total Appointment Time (timed & untimed codes): 55 minutes     Precautions: Standard     Subjective     Pt reports: He had a meet this past weekend and had some hip pain. Thinks it is due to warming up too much as he did it over a period of 2 hours.  He was compliant with home exercise program.  Response to previous treatment: less pain   Functional change: returning to training    Pain: 1/10  Location: right hip      Objective      Objective Measures updated at progress report unless specified.       YBT 1/13/2025:  - R anterior: 55  - L anterior: 60  - R posteromedial: 114  - L posteromedial: 118  - R posterolateral: 114  - L posterolateral: 110      Prone  - R ER: 51.8#   - L ER: 50.6 #  Seated Hip flexion  - R: 126#  - L: 125#    Quad strength:  R = 220#  L = 200#    Hip Range of Motion:   Right active Left active    Flexion 95 100   Extension 10 15   Ext. Rotation 40 45   Int. Rotation 30 35     YAIR: -    Treatment     AMERICO received the treatments listed below:      Therapeutic exercises to develop strength, endurance, ROM, flexibility, and posture for 0 minutes including:  Bike 5 min  Dynamic warm up  10 yards x 2  - knee to chest 2x10  - heel to butt  - sweeps  - lateral lunge  - Lateral walks  -  bethany [email or web address]    YAA received the following manual therapy techniques: Joint mobilizations were applied to the: Right hip for 0 minutes, including:    True Hip distraction grade 4-5 mobilization   Hip distraction with IR mobilization mobilization grade 3  Anterior hip mobilization grade 4    Not today:  LAD grade 4-5 mobilization  Mobilization with movement into hip flexion     YAA participated in neuromuscular re-education activities to improve: Coordination, Kinesthetic, Sense, Proprioception, and Posture for 55 minutes. The following activities were included:      Assessment as above    Not today    Cable rotations 3x12 hip extension isometric 10x5s holds x 2  Prone hip ER isometric 10x5s holds x 2  Side lying hip ER with hold 10x5s each way  Plank with hip extension 3x10 each  Power band rock backs 20x RTB  Power band hip extension 20x RTB      Not today:  Side plank with hip abduction 3x10 Holds  Standing Y strap hip ER 3x10 17#  Prone Hip ER isometric [id number] - hip extended   Manual ER resistance 3x5      Prone hip extension 10x3s   Lateral step down 3x5 10in  Prone ER IR 2x10  90/90 hip stretch 10x each  Modified side plank clamshell 2x10   Seated hip IR/ER hip mobilization x10    YAA participated in dynamic functional therapeutic activities to improve functional performance for 0 minutes, including:    Air planes 3x6 10#  10# hip flexion locke test position 3x8  SL hip thrust 18in box 3x8 each  Hip flexion Cable 3x10 17#  Lunge isometrics 3x5 60# to push back   Shuttle hip extension 3x10 4 cords      Not today  RFESS 18in box each 2x10  Sled Push 4x5 steps each 70#Cable body rotation 3x6 20#  SL hip thrust 3x10   Hip flexion eccentric 3x8  Lateral jumps off of right leg 3x6  SL Sit to stand 3x10 18in 10# medicine ball  Reverse lunge 2x10 60# explosive movement to single leg stance  Lateral Lunges 3x8 60# eccentric control to explosive return     Patient Education and Home Exercises        Education provided:   - Pt educated on performing Ant Pelvic tilt if attempting squatting.     Written Home Exercises Provided: Patient instructed to cont prior HEP. Exercises were reviewed and AMERICO was able to demonstrate them prior to the end of the session.  AMERICO demonstrated good  understanding of the education provided. See EMR under Patient Instructions for exercises provided during therapy sessions    Assessment   Americo was assessed and found to have made good improvements in his strength and range of motion. He does still have pain with activity and exercises, but continues to demonstrate progress. Plan to extend plank of care to continue to work on range of motion and strength of his right hip.       AMERICO Is progressing well towards his goals.   Pt prognosis is Good.     Pt will continue to benefit from skilled outpatient physical therapy to address the deficits listed in the problem list box on initial evaluation, provide pt/family education and to maximize pt's level of independence in the home and community environment.     Pt's spiritual, cultural and educational needs considered and pt agreeable to plan of care and goals.     Anticipated barriers to physical therapy: none    Goals:   STG (6 Weeks) (met)  Pt will be independent with Initial home exercise program in order to facilitate Physical Therapy treatment  Pt will reduce worst pain to 4/10 in order to perform ADLs  Pt will improve hip flexion and IR by 5' in order to perform recreational tasks  Pt will improve hip External rotation and flexion strength to >80% contralateral LE in order to perform recreational tasks      LTG(12weeks) (Progressing, not met)  Pt will be independent c final home exercise program in order to DC to home program  Pt will improve FOTO limitation to 76% indicative of overall improvement in function   Pt will improve worst pain to 1/10 in order to return to previous level of function   Pt will improve hip  External rotation and flexion strength to >90% contralateral LE in order to perform recreational tasks     Plan     Certification Period: 11/30/2024 to 2/30/2025  Recommended Treatment Plan: 1 times per week for 6 weeks: Manual Therapy, Neuromuscular Re-ed, Patient Education, Therapeutic Activities, and Therapeutic Exercise  Other Recommendations: modalities prn, ASTYM prn, Dry Needling prn    Therapist: Indio Quarles, PT, DPT    I CERTIFY THE NEED FOR THESE SERVICES FURNISHED UNDER THIS PLAN OF TREATMENT AND WHILE UNDER MY CARE    Physician's comments: ________________________________________________________________________________________________________________________________________________      Physician's Name: ___________________________________

## 2025-01-27 ENCOUNTER — CLINICAL SUPPORT (OUTPATIENT)
Dept: REHABILITATION | Facility: OTHER | Age: 20
End: 2025-01-27
Payer: COMMERCIAL

## 2025-01-27 DIAGNOSIS — R26.9 ABNORMAL GAIT: ICD-10-CM

## 2025-01-27 DIAGNOSIS — R29.898 DECREASED STRENGTH OF LOWER EXTREMITY: ICD-10-CM

## 2025-01-27 DIAGNOSIS — M25.651 IMPAIRED RANGE OF MOTION OF RIGHT HIP: ICD-10-CM

## 2025-01-27 DIAGNOSIS — M25.551 RIGHT HIP PAIN: Primary | ICD-10-CM

## 2025-01-27 PROCEDURE — 97110 THERAPEUTIC EXERCISES: CPT | Mod: PN

## 2025-01-27 PROCEDURE — 97530 THERAPEUTIC ACTIVITIES: CPT | Mod: PN

## 2025-01-27 NOTE — PROGRESS NOTES
[facility]   Physical Therapy Treatment Note      Name: Americo Ulloa  Clinic Number: [id number]    Therapy Diagnosis:   Encounter Diagnoses   Name Primary?    Right hip pain Yes    Abnormal gait     Impaired range of motion of right hip     Decreased strength of lower extremity            Physician: Wes Sullivan DO    Visit Date: 1/27/2025    Physician Orders: PT Eval and Treat   Medical Diagnosis from Referral: M25.551 (ICD-10-CM) - Pain of right hip   Evaluation Date: 6/5/2024  Authorization Period Expiration: 5/6/2024  Plan of Care Expiration: 8/30/2024  Progress Note Due: 8/5/2024  Visit # / Visits authorized: 2/ 20   FOTO: 6/5/2024     Time In: 11:05  Time Out: 12:00  Total Appointment Time (timed & untimed codes): 55 minutes     Precautions: Standard     Subjective     Pt reports: He feels like he is improving and having less hip pain.   He was compliant with home exercise program.  Response to previous treatment: less pain   Functional change: returning to training    Pain: 1/10  Location: right hip      Objective      Objective Measures updated at progress report unless specified.       YBT 1/13/2025:  - R anterior: 55  - L anterior: 60  - R posteromedial: 114  - L posteromedial: 118  - R posterolateral: 114  - L posterolateral: 110      Prone  - R ER: 51.8#   - L ER: 50.6 #  Seated Hip flexion  - R: 126#  - L: 125#    Quad strength:  R = 220#  L = 200#    Hip Range of Motion:   Right active Left active    Flexion 95 100   Extension 10 15   Ext. Rotation 40 45   Int. Rotation 30 35     YAIR: -    Treatment     AMERICO received the treatments listed below:      Therapeutic exercises to develop strength, endurance, ROM, flexibility, and posture for 17 minutes including:  Bike 5 min  Dynamic warm up  10 yards x 2  - knee to chest 2x10  - heel to butt  - sweeps  - lateral lunge  - Lateral walks  - monster walks    AMERICO received the following manual therapy techniques:  Joint mobilizations were applied to the: Right hip for 0 minutes, including:    True Hip distraction grade 4-5 mobilization   Hip distraction with IR mobilization mobilization grade 3  Anterior hip mobilization grade 4    Not today:  LAD grade 4-5 mobilization  Mobilization with movement into hip flexion     YAA participated in neuromuscular re-education activities to improve: Coordination, Kinesthetic, Sense, Proprioception, and Posture for 0 minutes. The following activities were included:          Not today    Cable rotations 3x12 hip extension isometric 10x5s holds x 2  Prone hip ER isometric 10x5s holds x 2  Side lying hip ER with hold 10x5s each way  Plank with hip extension 3x10 each  Power band rock backs 20x RTB  Power band hip extension 20x RTB  Assessment as above    Not today:  Side plank with hip abduction 3x10 Holds  Standing Y strap hip ER 3x10 17#  Prone Hip ER isometric [id number] - hip extended   Manual ER resistance 3x5      Prone hip extension 10x3s   Lateral step down 3x5 10in  Prone ER IR 2x10  90/90 hip stretch 10x each  Modified side plank clamshell 2x10   Seated hip IR/ER hip mobilization x10    YAA participated in dynamic functional therapeutic activities to improve functional performance for 38 minutes, including:      Reverse lunges 4x6 each 135#  Med ball throws 4x5  Air planes 3x6 30#  SL hip thrust 18in box 3x8 each      Not today  10# hip flexion locke test position 3x8    Hip flexion Cable 3x10 17#  Lunge isometrics 3x5 60# to push back   Shuttle hip extension 3x10 4 cords  RFESS 18in box each 2x10  Sled Push 4x5 steps each 70#Cable body rotation 3x6 20#  SL hip thrust 3x10   Hip flexion eccentric 3x8  Lateral jumps off of right leg 3x6  SL Sit to stand 3x10 18in 10# medicine ball  Reverse lunge 2x10 60# explosive movement to single leg stance  Lateral Lunges 3x8 60# eccentric control to explosive return     Patient Education and Home Exercises       Education provided:   -  Pt educated on performing Ant Pelvic tilt if attempting squatting.     Written Home Exercises Provided: Patient instructed to cont prior HEP. Exercises were reviewed and AMERICO was able to demonstrate them prior to the end of the session.  AMERICO demonstrated good  understanding of the education provided. See EMR under Patient Instructions for exercises provided during therapy sessions    Assessment   Americo has been making improvements in strength and range of motion. Pt has been tolerating exercise progressions well even though he does still report having some pain with activity. Pt needed verbal cues to perform exercises correctly. Pt would benefit from continued skilled PT services for strengthening, ROM, and functional mobility.      AMERICO Is progressing well towards his goals.   Pt prognosis is Good.     Pt will continue to benefit from skilled outpatient physical therapy to address the deficits listed in the problem list box on initial evaluation, provide pt/family education and to maximize pt's level of independence in the home and community environment.     Pt's spiritual, cultural and educational needs considered and pt agreeable to plan of care and goals.     Anticipated barriers to physical therapy: none    Goals:   STG (6 Weeks) (met)  Pt will be independent with Initial home exercise program in order to facilitate Physical Therapy treatment  Pt will reduce worst pain to 4/10 in order to perform ADLs  Pt will improve hip flexion and IR by 5' in order to perform recreational tasks  Pt will improve hip External rotation and flexion strength to >80% contralateral LE in order to perform recreational tasks      LTG(12weeks) (Progressing, not met)  Pt will be independent c final home exercise program in order to DC to home program  Pt will improve FOTO limitation to 76% indicative of overall improvement in function   Pt will improve worst pain to 1/10 in order to return to previous level of function   Pt  will improve hip External rotation and flexion strength to >90% contralateral LE in order to perform recreational tasks     Plan     Certification Period: 11/30/2024 to 2/30/2025  Recommended Treatment Plan: 1 times per week for 6 weeks: Manual Therapy, Neuromuscular Re-ed, Patient Education, Therapeutic Activities, and Therapeutic Exercise  Other Recommendations: modalities prn, ASTYM prn, Dry Needling prn    Therapist: Indio Quarles, PT, DPT  Tereza Wise, SPT

## 2025-02-10 ENCOUNTER — CLINICAL SUPPORT (OUTPATIENT)
Dept: REHABILITATION | Facility: OTHER | Age: 20
End: 2025-02-10
Payer: COMMERCIAL

## 2025-02-10 DIAGNOSIS — M25.551 RIGHT HIP PAIN: Primary | ICD-10-CM

## 2025-02-10 DIAGNOSIS — M25.651 IMPAIRED RANGE OF MOTION OF RIGHT HIP: ICD-10-CM

## 2025-02-10 DIAGNOSIS — R29.898 DECREASED STRENGTH OF LOWER EXTREMITY: ICD-10-CM

## 2025-02-10 DIAGNOSIS — R26.9 ABNORMAL GAIT: ICD-10-CM

## 2025-02-10 PROCEDURE — 97530 THERAPEUTIC ACTIVITIES: CPT | Mod: PN

## 2025-02-10 PROCEDURE — 97110 THERAPEUTIC EXERCISES: CPT | Mod: PN

## 2025-02-10 NOTE — PROGRESS NOTES
[facility]   Physical Therapy Treatment Note      Name: Americo Ulloa  Clinic Number: [id number]    Therapy Diagnosis:   Encounter Diagnoses   Name Primary?    Right hip pain Yes    Abnormal gait     Impaired range of motion of right hip     Decreased strength of lower extremity            Physician: Wes Sullivan DO    Visit Date: 2/10/2025    Physician Orders: PT Eval and Treat   Medical Diagnosis from Referral: M25.551 (ICD-10-CM) - Pain of right hip   Evaluation Date: 6/5/2024  Authorization Period Expiration: 5/6/2024  Plan of Care Expiration: 8/30/2024  Progress Note Due: 8/5/2024  Visit # / Visits authorized: 3/ 20   FOTO: 6/5/2024     Time In: 11:05  Time Out: 12:00  Total Appointment Time (timed & untimed codes): 55 minutes     Precautions: Standard     Subjective     Pt reports: He went to track meet and hip held up well  He was compliant with home exercise program.  Response to previous treatment: less pain   Functional change: returning to training    Pain: 1/10  Location: right hip      Objective      Objective Measures updated at progress report unless specified.       YBT 1/13/2025:  - R anterior: 55  - L anterior: 60  - R posteromedial: 114  - L posteromedial: 118  - R posterolateral: 114  - L posterolateral: 110      Prone  - R ER: 51.8#   - L ER: 50.6 #  Seated Hip flexion  - R: 126#  - L: 125#    Quad strength:  R = 220#  L = 200#    Hip Range of Motion:   Right active Left active    Flexion 95 100   Extension 10 15   Ext. Rotation 40 45   Int. Rotation 30 35     YAIR: -    Treatment     AMERICO received the treatments listed below:      Therapeutic exercises to develop strength, endurance, ROM, flexibility, and posture for 17 minutes including:  Bike 5 min  Dynamic warm up  10 yards x 2  - knee to chest 2x10  - heel to butt  - sweeps  - lateral lunge  - Lateral walks  - monster walks    AMERICO received the following manual therapy techniques: Joint  mobilizations were applied to the: Right hip for 0 minutes, including:    True Hip distraction grade 4-5 mobilization   Hip distraction with IR mobilization mobilization grade 3  Anterior hip mobilization grade 4    Not today:  LAD grade 4-5 mobilization  Mobilization with movement into hip flexion     YAA participated in neuromuscular re-education activities to improve: Coordination, Kinesthetic, Sense, Proprioception, and Posture for 0 minutes. The following activities were included:          Not today    Cable rotations 3x12 hip extension isometric 10x5s holds x 2  Prone hip ER isometric 10x5s holds x 2  Side lying hip ER with hold 10x5s each way  Plank with hip extension 3x10 each  Power band rock backs 20x RTB  Power band hip extension 20x RTB  Assessment as above    Not today:  Side plank with hip abduction 3x10 Holds  Standing Y strap hip ER 3x10 17#  Prone Hip ER isometric [id number] - hip extended   Manual ER resistance 3x5      Prone hip extension 10x3s   Lateral step down 3x5 10in  Prone ER IR 2x10  90/90 hip stretch 10x each  Modified side plank clamshell 2x10   Seated hip IR/ER hip mobilization x10    YAA participated in dynamic functional therapeutic activities to improve functional performance for 38 minutes, including:      Reverse lunges 4x6 each 135#  Med ball throws 4x5  Air planes 3x6 30#  SL hip thrust 18in box 3x8 each  10# hip flexion locke test position 3x8    Not today      Hip flexion Cable 3x10 17#  Lunge isometrics 3x5 60# to push back   Shuttle hip extension 3x10 4 cords  RFESS 18in box each 2x10  Sled Push 4x5 steps each 70#Cable body rotation 3x6 20#  SL hip thrust 3x10   Hip flexion eccentric 3x8  Lateral jumps off of right leg 3x6  SL Sit to stand 3x10 18in 10# medicine ball  Reverse lunge 2x10 60# explosive movement to single leg stance  Lateral Lunges 3x8 60# eccentric control to explosive return     Patient Education and Home Exercises       Education provided:   - Pt  educated on performing Ant Pelvic tilt if attempting squatting.     Written Home Exercises Provided: Patient instructed to cont prior HEP. Exercises were reviewed and AMERICO was able to demonstrate them prior to the end of the session.  AMERICO demonstrated good  understanding of the education provided. See EMR under Patient Instructions for exercises provided during therapy sessions    Assessment   Americo making good progress and will most likely be d/c soon if symptoms continue to be minimal/   Pt would benefit from continued skilled PT services for strengthening, ROM, and functional mobility.      AMERICO Is progressing well towards his goals.   Pt prognosis is Good.     Pt will continue to benefit from skilled outpatient physical therapy to address the deficits listed in the problem list box on initial evaluation, provide pt/family education and to maximize pt's level of independence in the home and community environment.     Pt's spiritual, cultural and educational needs considered and pt agreeable to plan of care and goals.     Anticipated barriers to physical therapy: none    Goals:   STG (6 Weeks) (met)  Pt will be independent with Initial home exercise program in order to facilitate Physical Therapy treatment  Pt will reduce worst pain to 4/10 in order to perform ADLs  Pt will improve hip flexion and IR by 5' in order to perform recreational tasks  Pt will improve hip External rotation and flexion strength to >80% contralateral LE in order to perform recreational tasks      LTG(12weeks) (Progressing, not met)  Pt will be independent c final home exercise program in order to DC to home program  Pt will improve FOTO limitation to 76% indicative of overall improvement in function   Pt will improve worst pain to 1/10 in order to return to previous level of function   Pt will improve hip External rotation and flexion strength to >90% contralateral LE in order to perform recreational tasks     Plan      Certification Period: 11/30/2024 to 2/30/2025  Recommended Treatment Plan: 1 times per week for 6 weeks: Manual Therapy, Neuromuscular Re-ed, Patient Education, Therapeutic Activities, and Therapeutic Exercise  Other Recommendations: modalities prn, ASTYM prn, Dry Needling prn    Therapist: Indio Quarles, PT, DPT

## 2025-02-24 ENCOUNTER — CLINICAL SUPPORT (OUTPATIENT)
Dept: REHABILITATION | Facility: OTHER | Age: 20
End: 2025-02-24
Payer: COMMERCIAL

## 2025-02-24 DIAGNOSIS — R29.898 DECREASED STRENGTH OF LOWER EXTREMITY: ICD-10-CM

## 2025-02-24 DIAGNOSIS — M25.551 RIGHT HIP PAIN: Primary | ICD-10-CM

## 2025-02-24 DIAGNOSIS — R26.9 ABNORMAL GAIT: ICD-10-CM

## 2025-02-24 DIAGNOSIS — M25.651 IMPAIRED RANGE OF MOTION OF RIGHT HIP: ICD-10-CM

## 2025-02-24 PROCEDURE — 97110 THERAPEUTIC EXERCISES: CPT | Mod: PN

## 2025-02-24 PROCEDURE — 97112 NEUROMUSCULAR REEDUCATION: CPT | Mod: PN

## 2025-02-24 NOTE — PROGRESS NOTES
[facility]   Physical Therapy Treatment Note      Name: Americo Ulloa  Clinic Number: [id number]    Therapy Diagnosis:   Encounter Diagnoses   Name Primary?    Right hip pain Yes    Abnormal gait     Impaired range of motion of right hip     Decreased strength of lower extremity            Physician: Wes Sullivan DO    Visit Date: 2/24/2025    Physician Orders: PT Eval and Treat   Medical Diagnosis from Referral: M25.551 (ICD-10-CM) - Pain of right hip   Evaluation Date: 6/5/2024  Authorization Period Expiration: 5/6/2024  Plan of Care Expiration: 8/30/2024  Progress Note Due: 8/5/2024  Visit # / Visits authorized: 4/ 20   FOTO: 6/5/2024     Time In: 11:00  Time Out: 11:55  Total Appointment Time (timed & untimed codes): 55 minutes     Precautions: Standard     Subjective     Pt reports: He has no pain in his hip today  He was compliant with home exercise program.  Response to previous treatment: less pain   Functional change: returning to training    Pain: 0/10  Location: right hip      Objective      Objective Measures updated at progress report unless specified.       YBT 1/13/2025:  - R anterior: 55  - L anterior: 60  - R posteromedial: 114  - L posteromedial: 118  - R posterolateral: 114  - L posterolateral: 110      Prone  - R ER: 51.8#   - L ER: 50.6 #  Seated Hip flexion  - R: 126#  - L: 125#    Quad strength:  R = 220#  L = 200#    Hip Range of Motion:   Right active Left active    Flexion 95 100   Extension 10 15   Ext. Rotation 40 45   Int. Rotation 30 35     YAIR: -    Treatment     AMERICO received the treatments listed below:      Therapeutic exercises to develop strength, endurance, ROM, flexibility, and posture for 17 minutes including:  Bike 5 min  Dynamic warm up  10 yards x 2  - knee to chest 2x10  - heel to butt  - sweeps  - lateral lunge  - Lateral walks  - monster walks    AMERICO received the following manual therapy techniques: Joint mobilizations were  applied to the: Right hip for 0 minutes, including:    True Hip distraction grade 4-5 mobilization   Hip distraction with IR mobilization mobilization grade 3  Anterior hip mobilization grade 4    Not today:  LAD grade 4-5 mobilization  Mobilization with movement into hip flexion     YAA participated in neuromuscular re-education activities to improve: Coordination, Kinesthetic, Sense, Proprioception, and Posture for 0 minutes. The following activities were included:          Not today    Cable rotations 3x12 hip extension isometric 10x5s holds x 2  Prone hip ER isometric 10x5s holds x 2  Side lying hip ER with hold 10x5s each way  Plank with hip extension 3x10 each  Power band rock backs 20x RTB  Power band hip extension 20x RTB  Assessment as above    Not today:  Side plank with hip abduction 3x10 Holds  Standing Y strap hip ER 3x10 17#  Prone Hip ER isometric [id number] - hip extended   Manual ER resistance 3x5      Prone hip extension 10x3s   Lateral step down 3x5 10in  Prone ER IR 2x10  90/90 hip stretch 10x each  Modified side plank clamshell 2x10   Seated hip IR/ER hip mobilization x10    YAA participated in dynamic functional therapeutic activities to improve functional performance for 38 minutes, including:    Med ball throws 4x5 10#  Air planes 3x6 30#  SL hip Thurst 18in 3x8 25#  Split squat jumps 3x4 20#  SL Yielding isometric 4x20s 60#    Not today      Reverse lunges 4x6 each 135#  10# hip flexion locke test position 3x8  Hip flexion Cable 3x10 17#  Lunge isometrics 3x5 60# to push back   Shuttle hip extension 3x10 4 cords  RFESS 18in box each 2x10  Sled Push 4x5 steps each 70#Cable body rotation 3x6 20#  SL hip thrust 3x10   Hip flexion eccentric 3x8  Lateral jumps off of right leg 3x6  SL Sit to stand 3x10 18in 10# medicine ball  Reverse lunge 2x10 60# explosive movement to single leg stance  Lateral Lunges 3x8 60# eccentric control to explosive return     Patient Education and Home Exercises        Education provided:   - Pt educated on performing Ant Pelvic tilt if attempting squatting.     Written Home Exercises Provided: Patient instructed to cont prior HEP. Exercises were reviewed and AMERICO was able to demonstrate them prior to the end of the session.  AMERICO demonstrated good  understanding of the education provided. See EMR under Patient Instructions for exercises provided during therapy sessions    Assessment   Americo did not have any pain during his treatment session. He is performing well with track and will be d/c after next session.   Pt would benefit from continued skilled PT services for strengthening, ROM, and functional mobility.      AMERICO Is progressing well towards his goals.   Pt prognosis is Good.     Pt will continue to benefit from skilled outpatient physical therapy to address the deficits listed in the problem list box on initial evaluation, provide pt/family education and to maximize pt's level of independence in the home and community environment.     Pt's spiritual, cultural and educational needs considered and pt agreeable to plan of care and goals.     Anticipated barriers to physical therapy: none    Goals:   STG (6 Weeks) (met)  Pt will be independent with Initial home exercise program in order to facilitate Physical Therapy treatment  Pt will reduce worst pain to 4/10 in order to perform ADLs  Pt will improve hip flexion and IR by 5' in order to perform recreational tasks  Pt will improve hip External rotation and flexion strength to >80% contralateral LE in order to perform recreational tasks      LTG(12weeks) (Progressing, not met)  Pt will be independent c final home exercise program in order to DC to home program  Pt will improve FOTO limitation to 76% indicative of overall improvement in function   Pt will improve worst pain to 1/10 in order to return to previous level of function   Pt will improve hip External rotation and flexion strength to >90% contralateral  LE in order to perform recreational tasks     Plan     Certification Period: 11/30/2024 to 2/30/2025  Recommended Treatment Plan: 1 times per week for 6 weeks: Manual Therapy, Neuromuscular Re-ed, Patient Education, Therapeutic Activities, and Therapeutic Exercise  Other Recommendations: modalities prn, ASTYM prn, Dry Needling prn    Therapist: Indio Quarles, PT, DPT

## 2025-03-10 ENCOUNTER — CLINICAL SUPPORT (OUTPATIENT)
Dept: REHABILITATION | Facility: OTHER | Age: 20
End: 2025-03-10
Payer: COMMERCIAL

## 2025-03-10 DIAGNOSIS — R29.898 DECREASED STRENGTH OF LOWER EXTREMITY: ICD-10-CM

## 2025-03-10 DIAGNOSIS — M25.651 IMPAIRED RANGE OF MOTION OF RIGHT HIP: ICD-10-CM

## 2025-03-10 DIAGNOSIS — M25.551 RIGHT HIP PAIN: Primary | ICD-10-CM

## 2025-03-10 DIAGNOSIS — R26.9 ABNORMAL GAIT: ICD-10-CM

## 2025-03-10 PROCEDURE — 97110 THERAPEUTIC EXERCISES: CPT | Mod: PN

## 2025-03-10 PROCEDURE — 97530 THERAPEUTIC ACTIVITIES: CPT | Mod: PN

## 2025-03-13 NOTE — PROGRESS NOTES
[facility]   Physical Therapy Treatment Note      Name: Americo Ulloa  Clinic Number: [id number]    Therapy Diagnosis:   Encounter Diagnoses   Name Primary?    Right hip pain Yes    Abnormal gait     Impaired range of motion of right hip     Decreased strength of lower extremity            Physician: Wes Sullivan DO    Visit Date: 3/10/2025    Physician Orders: PT Eval and Treat   Medical Diagnosis from Referral: M25.551 (ICD-10-CM) - Pain of right hip   Evaluation Date: 6/5/2024  Authorization Period Expiration: 5/6/2024  Plan of Care Expiration: 8/30/2024  Progress Note Due: 8/5/2024  Visit # / Visits authorized: 5/ 20   FOTO: 6/5/2024     Time In: 11:00  Time Out: 11:55  Total Appointment Time (timed & untimed codes): 55 minutes     Precautions: Standard     Subjective     Pt reports: He is feeling well today without any pain. 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   He was compliant with home exercise program.  Response to previous treatment: less pain   Functional change: returning to training    Pain: 0/10  Location: right hip      Objective      Objective Measures updated at progress report unless specified.       YBT 1/13/2025:  - R anterior: 55  - L anterior: 60  - R posteromedial: 114  - L posteromedial: 118  - R posterolateral: 114  - L posterolateral: 110      Prone  - R ER: 51.8#   - L ER: 50.6 #  Seated Hip flexion  - R: 126#  - L: 125#    Quad strength:  R = 220#  L = 200#    Hip Range of Motion:   Right active Left active    Flexion 95 100   Extension 10 15   Ext. Rotation 40 45   Int. Rotation 30 35     YAIR: -    Treatment     AMERICO received the treatments listed below:      Therapeutic exercises to develop strength, endurance, ROM, flexibility, and posture for 17 minutes including:  Bike 5 min  Dynamic warm up  10 yards x 2  - knee to chest 2x10  - heel to butt  - sweeps  - lateral lunge  - Lateral walks  - monster walks    AMERICO received the following manual  therapy techniques: Joint mobilizations were applied to the: Right hip for 0 minutes, including:    True Hip distraction grade 4-5 mobilization   Hip distraction with IR mobilization mobilization grade 3  Anterior hip mobilization grade 4    Not today:  LAD grade 4-5 mobilization  Mobilization with movement into hip flexion     YAA participated in neuromuscular re-education activities to improve: Coordination, Kinesthetic, Sense, Proprioception, and Posture for 0 minutes. The following activities were included:          Not today    Cable rotations 3x12 hip extension isometric 10x5s holds x 2  Prone hip ER isometric 10x5s holds x 2  Side lying hip ER with hold 10x5s each way  Plank with hip extension 3x10 each  Power band rock backs 20x RTB  Power band hip extension 20x RTB  Assessment as above    Not today:  Side plank with hip abduction 3x10 Holds  Standing Y strap hip ER 3x10 17#  Prone Hip ER isometric [id number] - hip extended   Manual ER resistance 3x5      Prone hip extension 10x3s   Lateral step down 3x5 10in  Prone ER IR 2x10  90/90 hip stretch 10x each  Modified side plank clamshell 2x10   Seated hip IR/ER hip mobilization x10    YAA participated in dynamic functional therapeutic activities to improve functional performance for 38 minutes, including:    Med ball throws 4x5 10#  Air planes 3x6 30#  SL hip Thurst 18in 3x8 25#  Split squat jumps 3x4 20#  SL Yielding isometric 4x20s 60#  Reverse lunges 4x6 each 135#    Not today        10# hip flexion locke test position 3x8  Hip flexion Cable 3x10 17#  Lunge isometrics 3x5 60# to push back   Shuttle hip extension 3x10 4 cords  RFESS 18in box each 2x10  Sled Push 4x5 steps each 70#Cable body rotation 3x6 20#  SL hip thrust 3x10   Hip flexion eccentric 3x8  Lateral jumps off of right leg 3x6  SL Sit to stand 3x10 18in 10# medicine ball  Reverse lunge 2x10 60# explosive movement to single leg stance  Lateral Lunges 3x8 60# eccentric control to explosive  return     Patient Education and Home Exercises       Education provided:   - Pt educated on performing Ant Pelvic tilt if attempting squatting.     Written Home Exercises Provided: Patient instructed to cont prior HEP. Exercises were reviewed and AMERICO was able to demonstrate them prior to the end of the session.  AMERICO demonstrated good  understanding of the education provided. See EMR under Patient Instructions for exercises provided during therapy sessions    Assessment   Americo has progressed well through PT and is no longer having any symptoms. He will be d/c at this time.       AMERICO Is progressing well towards his goals.   Pt prognosis is Good.     Pt's spiritual, cultural and educational needs considered and pt agreeable to plan of care and goals.     Anticipated barriers to physical therapy: none    Goals:   STG (6 Weeks) (met)  Pt will be independent with Initial home exercise program in order to facilitate Physical Therapy treatment  Pt will reduce worst pain to 4/10 in order to perform ADLs  Pt will improve hip flexion and IR by 5' in order to perform recreational tasks  Pt will improve hip External rotation and flexion strength to >80% contralateral LE in order to perform recreational tasks      LTG(12weeks) (met)  Pt will be independent c final home exercise program in order to DC to home program  Pt will improve FOTO limitation to 76% indicative of overall improvement in function   Pt will improve worst pain to 1/10 in order to return to previous level of function   Pt will improve hip External rotation and flexion strength to >90% contralateral LE in order to perform recreational tasks     Plan     Certification Period: 11/30/2024 to 2/30/2025    D/C    Therapist: Indio Quarles, PT, DPT